# Patient Record
Sex: MALE | Race: WHITE | NOT HISPANIC OR LATINO | Employment: FULL TIME | URBAN - METROPOLITAN AREA
[De-identification: names, ages, dates, MRNs, and addresses within clinical notes are randomized per-mention and may not be internally consistent; named-entity substitution may affect disease eponyms.]

---

## 2017-02-01 ENCOUNTER — OFFICE VISIT (OUTPATIENT)
Dept: ORTHOPEDICS | Facility: CLINIC | Age: 27
End: 2017-02-01
Payer: COMMERCIAL

## 2017-02-01 ENCOUNTER — RADIANT APPOINTMENT (OUTPATIENT)
Dept: GENERAL RADIOLOGY | Facility: CLINIC | Age: 27
End: 2017-02-01
Attending: FAMILY MEDICINE
Payer: COMMERCIAL

## 2017-02-01 VITALS
BODY MASS INDEX: 24.12 KG/M2 | DIASTOLIC BLOOD PRESSURE: 86 MMHG | HEIGHT: 73 IN | WEIGHT: 182 LBS | SYSTOLIC BLOOD PRESSURE: 128 MMHG

## 2017-02-01 DIAGNOSIS — M79.645 FINGER PAIN, LEFT: ICD-10-CM

## 2017-02-01 DIAGNOSIS — M79.645 FINGER PAIN, LEFT: Primary | ICD-10-CM

## 2017-02-01 PROCEDURE — 99203 OFFICE O/P NEW LOW 30 MIN: CPT | Performed by: FAMILY MEDICINE

## 2017-02-01 PROCEDURE — 73140 X-RAY EXAM OF FINGER(S): CPT | Mod: LT

## 2017-02-01 NOTE — PATIENT INSTRUCTIONS
Thank you for allowing us to participate in your care today.  Please find below your visit diagnosis and the plan going forward.    1. Finger pain, left      Discussed your exam which is suggestive of a collateral injury  Activity modification as discussed - would mohan tape with frisbee and climbing (if you choose to do prior to hand therapy)  Hand therapy: [facility] - [phone]    Follow up after 4-6 visits of therapy if still having pain / not able to return to activty or sooner if needed. Call direct clinic number [[phone]] at any time with questions or concerns.    Prosper Rincon DO CAQSM  [facility]  Website: [email or web address]  Twitter: [email or web address]

## 2017-02-01 NOTE — MR AVS SNAPSHOT
After Visit Summary   2/1/2017    Wally Jalloh    MRN: [id number]           Patient Information     Date Of Birth          1990        Visit Information        Provider Department      2/1/2017 9:40 AM Prosper Rincon DO [facility] SP[facility]TS MEDICINE        Today's Diagnoses     Finger pain, left    -  1       Care Instructions    Thank you for allowing us to participate in your care today.  Please find below your visit diagnosis and the plan going forward.    1. Finger pain, left      Discussed your exam which is suggestive of a collateral injury  Activity modification as discussed - would mohan tape with frisbee and climbing (if you choose to do prior to hand therapy)  Hand therapy: [facility] - [phone]    Follow up after 4-6 visits of therapy if still having pain / not able to return to activty or sooner if needed. Call direct clinic number [[phone]] at any time with questions or concerns.    Prosper Rincon DO [facility]  Website: [email or web address]  Twitter: [email or web address]          Follow-ups after your visit        Additional Services     [facility] PT, HAND, AND CHIROPRACTIC REFERRAL       **This order will print in the [facility] Scheduling Office**    Physical Therapy, Hand Therapy and Chiropractic Care are available through:    *[facility]  *[facility]  *[facility]    Call one number to schedule at any of the above locations: [phone].    Your provider has referred you to: Hand Therapy    Indication/Reason for Referral: Left ring finger pain (PIP joint, collateral ligament)  Onset of Illness: 3 months  Therapy Orders: Evaluate and Treat  Special Programs: None  Special Request: [facility] [place]      Additional Comments for the Therapist or Chiropractor:     Please be aware that coverage of these services is subject to the terms and limitations of your  "health insurance plan.  Call member services at your health plan with any benefit or coverage questions.      Please bring the following to your appointment:    *Your personal calendar for scheduling future appointments  *Comfortable clothing                  Who to contact     If you have questions or need follow up information about today's clinic visit or your schedule please contact [facility] directly at [phone].  Normal or non-critical lab and imaging results will be communicated to you by MyChart, letter or phone within 4 business days after the clinic has received the results. If you do not hear from us within 7 days, please contact the clinic through [employer] or phone. If you have a critical or abnormal lab result, we will notify you by phone as soon as possible.  Submit refill requests through [employer] or call your pharmacy and they will forward the refill request to us. Please allow 3 business days for your refill to be completed.          Additional Information About Your Visit        [employer] Information     [employer] lets you send messages to your doctor, view your test results, renew your prescriptions, schedule appointments and more. To sign up, go to [email or web address] . Click on \"Log in\" on the left side of the screen, which will take you to the Welcome page. Then click on \"Sign up Now\" on the right side of the page.     You will be asked to enter the access code listed below, as well as some personal information. Please follow the directions to create your username and password.     Your access code is: [email or web address]  Expires: 2017 10:21 AM     Your access code will  in 90 days. If you need help or a new code, please call your [place] clinic or [phone].        Care EveryWhere ID     This is your Care EveryWhere ID. This could be used by other organizations to access your [place] medical records  [id number]        Your Vitals Were     Height BMI (Body " "Mass Index)                6' 1\" (1.854 m) 24.02 kg/m2           Blood Pressure from Last 3 Encounters:   02/01/17 128/86   10/14/15 138/72    Weight from Last 3 Encounters:   02/01/17 182 lb (82.555 kg)   10/14/15 183 lb (83.008 kg)              We Performed the Following     [facility] PT, HAND, AND CHIROPRACTIC REFERRAL        Primary Care Provider    Physician No Ref-Primary       No address on file        Thank you!     Thank you for choosing [facility]  for your care. Our goal is always to provide you with excellent care. Hearing back from our patients is one way we can continue to improve our services. Please take a few minutes to complete the written survey that you may receive in the mail after your visit with us. Thank you!             Your Updated Medication List - Protect others around you: Learn how to safely use, store and throw away your medicines at www.disposemymeds.org.      Notice  As of 2/1/2017 10:21 AM    You have not been prescribed any medications.      "

## 2017-02-01 NOTE — NURSING NOTE
"Chief Complaint   Patient presents with     Musculoskeletal Problem       Initial /86 mmHg  Ht 6' 1\" (1.854 m)  Wt 182 lb (82.555 kg)  BMI 24.02 kg/m2 Estimated body mass index is 24.02 kg/(m^2) as calculated from the following:    Height as of this encounter: 6' 1\" (1.854 m).    Weight as of this encounter: 182 lb (82.555 kg).  BP completed using cuff size: zaira Huerta ATC    "

## 2017-02-01 NOTE — PROGRESS NOTES
"ASSESSMENT & PLAN    ICD-10-CM    1. Finger pain, left M79.645 XR Finger Left G/E 2 Views     [facility], HAND, AND CHIR[facility]RACTIC REFERRAL   Reviewed xry - no fractures or acute osseous abnormalities  Exam appears consistent with a collateral sprain has a solid endpoint  Referral made to hand therapy for guidance on rehabilitation and progression back to normal activity.  Given the paper prescription in case he chooses to pursue outside of [place]  Discussed buddy taping with any activity in the interim and until he can progress forward with an therapy    Follow up as needed. Call direct clinic number [phone] at any time with questions or concerns. Instructed to call the office if the condition evolves or worsens.    -----    SUBJECTIVE  Wally Jalloh is a/an 26 year old right hand dominant male who is seen as self referral for evaluation of left ring finger (first joint) pain. The patient is seen by themselves.    Onset: 3 month(s) ago. Patient describes injury as he was running on a treadmill and jammed it on the handle of a treadmill.  Worsened by: unknown  Better with: stretching  Quality: stiff, lack of strength  Pain Scale (maximum/current)/10: 2/10 / 1/10  Treatments tried: rest/activity avoidance, ice and tape, theraputty  Orthopedic history: NO  Relevant surgical history: NO  Patient Social History: works as     Patient's past medical, surgical, social, and family histories were reviewed today and exceptions are as follows: see history.    REVIEW OF SYSTEMS:  10 point ROS is negative other than symptoms noted above in HPI, Past Medical History or as stated below  Constitutional: NEGATIVE for fever, chills, change in weight  Skin: NEGATIVE for worrisome rashes, moles or lesions  GI/: NEGATIVE for bowel or bladder changes  Neuro: NEGATIVE for weakness, dizziness or paresthesias    OBJECTIVE:  /86 mmHg  Ht 6' 1\" (1.854 m)  Wt 182 lb (82.555 kg)  BMI 24.02 kg/m2   General: " healthy, alert and in no distress  HEENT: no scleral icterus or conjunctival erythema  Skin: no suspicious lesions or rash. No jaundice.  CV: regular rhythm by palpation  Resp: normal respiratory effort without conversational dyspnea   Psych: normal mood and affect  Gait: normal steady gait with appropriate coordination and balance  Neuro: normal light touch sensory exam of the bilateral hands.  Motor strength as noted below  MSK:  LEFT HAND  Inspection:    Trace swelling over PIP of the left ring finger   Palpation:   Fingers: Nontender over the volar plate collateral's and central slip.  Range of Motion:    Full range of motion in flexion and extension  Strength:     full  Special Tests:    Positive: Has slight pain with stressing of radial collateral at the PIP joint with a solid endpoint.     Negative: extensor lag at DIP, flexor digitorum superficialis testing, flexor digitorum profundus testing    Independent visualization of the below image:  Recent Results (from the past 24 hour(s))   XR Finger Left G/E 2 Views    Narrative    LEFT FINGER TWO OR MORE VIEWS  2/1/2017 10:09 AM     COMPARISON: None.    HISTORY: Pain in left finger(s).    FINDINGS: The visualized bones and joint spaces are within normal  limits.      Impression    IMPRESSION: No evidence for fracture, dislocation or significant  degenerative change of the left fourth finger.    ELAINA ROSARIO MD     Patient's conditions were thoroughly discussed during today's visit with greater than 50% of the visit spent counseling the patient with total time spent face-to-face with the patient being 15 minutes.    Prosper Rincon, DO [facility]

## 2017-02-24 ENCOUNTER — TELEPHONE (OUTPATIENT)
Dept: FAMILY MEDICINE | Facility: CLINIC | Age: 27
End: 2017-02-24

## 2017-02-24 NOTE — TELEPHONE ENCOUNTER
Patient c/o upper abdominal/chest pain intermittent x 2 days. States pain is not one side or another, but spreads across chest and abdomen. Worse with exercise and after eating. Denies nausea. Describes pain as sharp when having episode. States some pain with deep inspirations and slight cough.     Advised patient to be seen in clinic or [facility] today.    Informed CORDELL of patient condition.    Concepcion Gonzalez RN

## 2018-07-14 ENCOUNTER — TRANSFERRED RECORDS (OUTPATIENT)
Dept: HEALTH INFORMATION MANAGEMENT | Facility: CLINIC | Age: 28
End: 2018-07-14

## 2018-07-18 ENCOUNTER — TELEPHONE (OUTPATIENT)
Dept: SURGERY | Facility: CLINIC | Age: 28
End: 2018-07-18

## 2018-07-18 ENCOUNTER — OFFICE VISIT (OUTPATIENT)
Dept: SURGERY | Facility: CLINIC | Age: 28
End: 2018-07-18
Payer: COMMERCIAL

## 2018-07-18 VITALS
DIASTOLIC BLOOD PRESSURE: 86 MMHG | SYSTOLIC BLOOD PRESSURE: 133 MMHG | HEIGHT: 73 IN | WEIGHT: 182 LBS | BODY MASS INDEX: 24.12 KG/M2 | HEART RATE: 54 BPM

## 2018-07-18 DIAGNOSIS — K40.90 LEFT INGUINAL HERNIA: Primary | ICD-10-CM

## 2018-07-18 PROCEDURE — 99244 OFF/OP CNSLTJ NEW/EST MOD 40: CPT | Performed by: SURGERY

## 2018-07-18 NOTE — LETTER
"2018    Re: Wally CAMARILLO Shubham - 1990    HPI: Patient is a 27-year-old gentleman referred by the above-mentioned provider for consultation regarding left-sided groin pain.  He participates in ultimate Frisbee states that he was injured approximately 2 months ago.  During a leg sweep maneuver he felt some degree of discomfort in his left upper thigh and groin.  This got worse during his game.  He continued to play through this and treated it primarily with stretches and hope that it would get outer.  This has however gotten progressively worse.  He took a couple of weeks off and felt some improvement in symptoms then.  He however has been unable to resume normal activity.  He has tried physical therapy without significant improvement.  Feels discomfort getting out of a car or rolling over in bed.  He has discomfort with core exercises as well as with sit ups and crunches.  He has pain with sneezing.  He has no radiating pain into his testicle.  No signs or symptoms to suggest incarceration or strangulation.     PMH:   has a past medical history of NO ACTIVE PROBLEMS.  PSH:    has a past surgical history that includes no history of surgery.  Social History:   reports that he has been smoking.  He has never used smokeless tobacco. He reports that he drinks alcohol. He reports that he does not use illicit drugs.  Family History:  family history includes Breast Cancer in his maternal grandmother; Family History Negative in his father and mother.  Medications/Allergies: Home medications and allergies reviewed.     ROS:  The 10 point Review of Systems is negative other than noted in the HPI.     Physical Exam:  /86  Pulse 54  Ht 6' 1\" (1.854 m)  Wt 182 lb (82.6 kg)  BMI 24.01 kg/m2  GENERAL: Generally appears well.  Psych: Alert and Oriented.  Normal affect  Eyes: Sclera clear  Respiratory:  Lungs clear to ausculation bilaterally with good air excursion  Cardiovascular:  Regular Rate and Rhythm with " no murmurs gallops or rubs, normal peripheral pulses  GI: Abdomen Non Distended Non-Tender  No hernias palpated..  Groin- I examined the patient in both the standing and supine positions. Right Groin- No hernia Palpated. Left Groin- Small inguinal hernia.  Tenderness palpated in groin. No scrotal or testicle abnormalities.  He also has isolated discomfort within the area around the insertion of the left adductor tendon.  Lymphatic/Hematologic/Immune:  No femoral or cervical lymphadenopathy.  Integumentary:  No rashes  Neurological: grossly intact      All new lab and imaging data was reviewed.      Impression and Plan:  Patient is a 27 year old male with left inguinal hernia and left adductor tendinopathy     PLAN: Recommend outpatient open repair of left inguinal hernia and would suggest coordinating with tendon lengthening surgery.  Referral will be made for orthopedic evaluation to evaluate and possibly proceed in this manner.  I discussed the pathophysiology of hernias and options for repair including laparoscopic VS open.  The risks associated with the procedure including, but not limited to, recurrence, nerve entrapment or injury, persistence of pain, injury to the bowel/bladder, infertility, hematoma, mesh migration, mesh infection, MI, and PE were discussed with the patient. He indicated understanding of the discussion, asked appropriate questions, and provided consent. Signs and symptoms of incarceration were discussed. If these develop in the interim, he promises to call or go straight to the ER. I have provided the patient with an information pamphlet.        Thank you very much for this consult.     Andrew Rush M.D.  [facility]  [phone]

## 2018-07-18 NOTE — TELEPHONE ENCOUNTER
Patient was in today and says he was referred to a doctor  By [facility] but doesn't remember the doctors  Name  Would like a call back with this information.      Phone:[phone]    Message ok

## 2018-07-18 NOTE — MR AVS SNAPSHOT
"              After Visit Summary   2018    Wally Jalloh    MRN: [id number]           Patient Information     Date Of Birth          1990        Visit Information        Provider Department      2018 2:15 PM Andrew Rush MD [facility]       Follow-ups after your visit        Who to contact     If you have questions or need follow up information about today's clinic visit or your schedule please contact [facility] directly at [phone].  Normal or non-critical lab and imaging results will be communicated to you by [employer], letter or phone within 4 business days after the clinic has received the results. If you do not hear from us within 7 days, please contact the clinic through [employer] or phone. If you have a critical or abnormal lab result, we will notify you by phone as soon as possible.  Submit refill requests through [employer] or call your pharmacy and they will forward the refill request to us. Please allow 3 business days for your refill to be completed.          Additional Information About Your Visit        MyChart Information     [employer] lets you send messages to your doctor, view your test results, renew your prescriptions, schedule appointments and more. To sign up, go to [email or web address] . Click on \"Log in\" on the left side of the screen, which will take you to the Welcome page. Then click on \"Sign up Now\" on the right side of the page.     You will be asked to enter the access code listed below, as well as some personal information. Please follow the directions to create your username and password.     Your access code is: [email or web address]  Expires: 10/16/2018  2:42 PM     Your access code will  in 90 days. If you need help or a new code, please call your [place] clinic or [phone].        Care EveryWhere ID     This is your Care EveryWhere ID. This could be used by other " "organizations to access your [place] medical records  [id number]        Your Vitals Were     Pulse Height BMI (Body Mass Index)             54 6' 1\" (1.854 m) 24.01 kg/m2          Blood Pressure from Last 3 Encounters:   07/18/18 133/86   02/01/17 128/86   10/14/15 138/72    Weight from Last 3 Encounters:   07/18/18 182 lb (82.6 kg)   02/01/17 182 lb (82.6 kg)   10/14/15 183 lb (83 kg)              Today, you had the following     No orders found for display       Primary Care Provider Fax #    Physician No Ref-Primary [phone]       No address on file        Equal Access to Services     GRACIE SALAMANCA : Diane Moody, yanely altamirano, papo travis, mary angeles . So [facility] [phone].    ATENCIÓN: Si habla español, tiene a reich disposición servicios gratuitos de asistencia lingüística. Llame al [phone].    We comply with applicable federal civil rights laws and [place] laws. We do not discriminate on the basis of race, color, national origin, age, disability, sex, sexual orientation, or gender identity.            Thank you!     Thank you for choosing [facility]  for your care. Our goal is always to provide you with excellent care. Hearing back from our patients is one way we can continue to improve our services. Please take a few minutes to complete the written survey that you may receive in the mail after your visit with us. Thank you!             Your Updated Medication List - Protect others around you: Learn how to safely use, store and throw away your medicines at www.disposemymeds.org.      Notice  As of 7/18/2018  2:42 PM    You have not been prescribed any medications.      "

## 2018-07-19 NOTE — TELEPHONE ENCOUNTER
Called patient back to provide name of Dr. Reji Chamorro.  Informed him that the schedulers at [facility] should be calling him to set up a consultation.    Ruthy Russo RN

## 2018-07-19 NOTE — PROGRESS NOTES
"[facility]  Surgery Consultation    CONSULTATION REQUESTED BY:  Garcia Tran MD    HPI: Patient is a 27-year-old gentleman referred by the above-mentioned provider for consultation regarding left-sided groin pain.  He participates in ultimate Frisbee states that he was injured approximately 2 months ago.  During a leg sweep maneuver he felt some degree of discomfort in his left upper thigh and groin.  This got worse during his game.  He continued to play through this and treated it primarily with stretches and hope that it would get outer.  This has however gotten progressively worse.  He took a couple of weeks off and felt some improvement in symptoms then.  He however has been unable to resume normal activity.  He has tried physical therapy without significant improvement.  Feels discomfort getting out of a car or rolling over in bed.  He has discomfort with core exercises as well as with sit ups and crunches.  He has pain with sneezing.  He has no radiating pain into his testicle.  No signs or symptoms to suggest incarceration or strangulation.    PMH:   has a past medical history of NO ACTIVE PROBLEMS.  PSH:    has a past surgical history that includes no history of surgery.  Social History:   reports that he has been smoking.  He has never used smokeless tobacco. He reports that he drinks alcohol. He reports that he does not use illicit drugs.  Family History:  family history includes Breast Cancer in his maternal grandmother; Family History Negative in his father and mother.  Medications/Allergies: Home medications and allergies reviewed.    ROS:  The 10 point Review of Systems is negative other than noted in the HPI.    Physical Exam:  /86  Pulse 54  Ht 6' 1\" (1.854 m)  Wt 182 lb (82.6 kg)  BMI 24.01 kg/m2  GENERAL: Generally appears well.  Psych: Alert and Oriented.  Normal affect  Eyes: Sclera clear  Respiratory:  Lungs clear to ausculation bilaterally with good air " excursion  Cardiovascular:  Regular Rate and Rhythm with no murmurs gallops or rubs, normal peripheral pulses  GI: Abdomen Non Distended Non-Tender  No hernias palpated..  Groin- I examined the patient in both the standing and supine positions. Right Groin- No hernia Palpated. Left Groin- Small inguinal hernia.  Tenderness palpated in groin. No scrotal or testicle abnormalities.  He also has isolated discomfort within the area around the insertion of the left adductor tendon.  Lymphatic/Hematologic/Immune:  No femoral or cervical lymphadenopathy.  Integumentary:  No rashes  Neurological: grossly intact     All new lab and imaging data was reviewed.     Impression and Plan:  Patient is a 27 year old male with left inguinal hernia and left adductor tendinopathy    PLAN: Recommend outpatient open repair of left inguinal hernia and would suggest coordinating with tendon lengthening surgery.  Referral will be made for orthopedic evaluation to evaluate and possibly proceed in this manner.  I discussed the pathophysiology of hernias and options for repair including laparoscopic VS open.  The risks associated with the procedure including, but not limited to, recurrence, nerve entrapment or injury, persistence of pain, injury to the bowel/bladder, infertility, hematoma, mesh migration, mesh infection, MI, and PE were discussed with the patient. He indicated understanding of the discussion, asked appropriate questions, and provided consent. Signs and symptoms of incarceration were discussed. If these develop in the interim, he promises to call or go straight to the ER. I have provided the patient with an information pamphlet.      Thank you very much for this consult.    Andrew Rush M.D.  [facility]  [phone]    Please route or send letter to:  Primary Care Provider (PCP) and Referring Provider

## 2018-07-24 ENCOUNTER — TELEPHONE (OUTPATIENT)
Dept: SURGERY | Facility: CLINIC | Age: 28
End: 2018-07-24

## 2018-07-24 NOTE — TELEPHONE ENCOUNTER
Type of surgery: Open left inguinal hernia repair  Location of surgery: [facility]  Date and time of surgery: 8/21/18 at 1pm  Surgeon: Dr. Andrew Rush  Pre-Op Appt Date: Patient to schedule  Post-Op Appt Date: Patient to schedule   Packet sent out: Yes  Pre-cert/Authorization completed:  Not Applicable  Date: 7/24/18    Coordinated with Dr. Chamorro adductor lengthening ([facility]ehra #[phone])

## 2018-07-24 NOTE — TELEPHONE ENCOUNTER
Name of caller: Patient    Reason for Call:  Scheduled for LIH repair--coordinated with Dr. Chamorro adductor lengthening on 8/21/18.  Since the 21st is the first available date for both surgeons patient is wondering what he can do for pain control in the meantime and would also like to discuss recovery expectations.    Surgeon:  Dr. Rush     Recent Surgery:  No    If yes, when & what type:  N/A      Best phone number to reach pt at is: [phone]  Ok to leave a message with medical info? Yes.    Pharmacy preferred (if calling for a refill): N/A

## 2018-07-24 NOTE — TELEPHONE ENCOUNTER
Patient will come in tomorrow to discuss questions regarding surgery in person with Dr. Rush.    Ruthy Russo RN

## 2018-07-25 ENCOUNTER — OFFICE VISIT (OUTPATIENT)
Dept: SURGERY | Facility: CLINIC | Age: 28
End: 2018-07-25
Payer: COMMERCIAL

## 2018-07-25 DIAGNOSIS — K40.90 LEFT INGUINAL HERNIA: Primary | ICD-10-CM

## 2018-07-25 PROCEDURE — 99213 OFFICE O/P EST LOW 20 MIN: CPT | Performed by: SURGERY

## 2018-07-25 NOTE — MR AVS SNAPSHOT
After Visit Summary   7/25/2018    Wally Jalloh    MRN: [id number]           Patient Information     Date Of Birth          1990        Visit Information        Provider Department      7/25/2018 1:15 PM Andrew Rush MD [facility] General Surgery      Today's Diagnoses     Left inguinal hernia    -  1      Care Instructions    Your surgery has been changed to 8/28/18 1:00 pm at [facility]          Follow-ups after your visit        Your next 10 appointments already scheduled     Aug 02, 2018  8:45 AM CDT   CONSULT with Tk Rodriguez MD   [facility] ([facility])    [address]  [facility] [id number] [phone]            Aug 28, 2018   Procedure with Andrew Rush MD   [facility] (--)    [address]  [facility] [address] [phone]            Aug 28, 2018  1:00 PM CDT   [facility] Same Day Surgery with Andrwe Rush MD   [facility])    [facility])   [phone]              Who to contact     If you have questions or need follow up information about today's clinic visit or your schedule please contact [facility] directly at [phone].  Normal or non-critical lab and imaging results will be communicated to you by MyChart, letter or phone within 4 business days after the clinic has received the results. If you do not hear from us within 7 days, please contact the clinic through MyChart or phone. If you have a critical or abnormal lab result, we will notify you by phone as soon as possible.  Submit refill requests through [employer] or call your pharmacy and they will forward the refill request to us. Please allow 3 business days for your refill to be completed.           "Additional Information About Your Visit        MyChart Information     [employer] lets you send messages to your doctor, view your test results, renew your prescriptions, schedule appointments and more. To sign up, go to [email or web address] . Click on \"Log in\" on the left side of the screen, which will take you to the Welcome page. Then click on \"Sign up Now\" on the right side of the page.     You will be asked to enter the access code listed below, as well as some personal information. Please follow the directions to create your username and password.     Your access code is: [email or web address]  Expires: 10/16/2018  2:42 PM     Your access code will  in 90 days. If you need help or a new code, please call your [place] clinic or [phone].        Care EveryWhere ID     This is your Care EveryWhere ID. This could be used by other organizations to access your [place] medical records  [id number]         Blood Pressure from Last 3 Encounters:   18 133/86   17 128/86   10/14/15 138/72    Weight from Last 3 Encounters:   18 182 lb (82.6 kg)   17 182 lb (82.6 kg)   10/14/15 183 lb (83 kg)              Today, you had the following     No orders found for display       Primary Care Provider Fax #    Physician No Ref-Primary [phone]       No address on file        Equal Access to Services     [facility] : Hadii zaheer ku hadasho Sodavidali, waaxda luqadaha, qaybta kaalmada adeegyada, mary angeles . So [facility] [phone].    ATENCIÓN: Si habla español, tiene a reich disposición servicios gratuitos de asistencia lingüística. John al [phone].    We comply with applicable federal civil rights laws and [place] laws. We do not discriminate on the basis of race, color, national origin, age, disability, sex, sexual orientation, or gender identity.            Thank you!     Thank you for choosing [facility]  for your care. Our goal is always to provide " you with excellent care. Hearing back from our patients is one way we can continue to improve our services. Please take a few minutes to complete the written survey that you may receive in the mail after your visit with us. Thank you!             Your Updated Medication List - Protect others around you: Learn how to safely use, store and throw away your medicines at www.disposemymeds.org.      Notice  As of 7/25/2018 11:59 PM    You have not been prescribed any medications.

## 2018-07-25 NOTE — LETTER
2018    RE: Wally Jalloh, : 1990        Patient presents to discuss upcoming surgery.  Scheduled for left inguinal hernia repair and tendon lengthening surgery.  He had several questions.  Been in the office and discussed his procedure.  We also discussed the outcomes.  His questions were all answered to his satisfaction.  We will proceed with surgery as scheduled.    Andrew Rush MD

## 2018-07-28 NOTE — PROGRESS NOTES
Patient presents to discuss upcoming surgery.  Scheduled for left inguinal hernia repair and tendon lengthening surgery.  He had several questions.  Been in the office and discussed his procedure.  We also discussed the outcomes.  His questions were all answered to his satisfaction.  We will proceed with surgery as scheduled.  Total time spent was 20 minutes with greater than 50% in face-to-face consultation.

## 2018-08-23 ENCOUNTER — TELEPHONE (OUTPATIENT)
Dept: SURGERY | Facility: CLINIC | Age: 28
End: 2018-08-23

## 2018-08-23 ENCOUNTER — OFFICE VISIT (OUTPATIENT)
Dept: FAMILY MEDICINE | Facility: CLINIC | Age: 28
End: 2018-08-23
Payer: COMMERCIAL

## 2018-08-23 VITALS
DIASTOLIC BLOOD PRESSURE: 72 MMHG | OXYGEN SATURATION: 95 % | SYSTOLIC BLOOD PRESSURE: 122 MMHG | BODY MASS INDEX: 24.52 KG/M2 | WEIGHT: 185 LBS | HEART RATE: 69 BPM | HEIGHT: 73 IN | TEMPERATURE: 97.3 F

## 2018-08-23 DIAGNOSIS — Z01.818 PREOP GENERAL PHYSICAL EXAM: Primary | ICD-10-CM

## 2018-08-23 DIAGNOSIS — K40.90 LEFT INGUINAL HERNIA: ICD-10-CM

## 2018-08-23 DIAGNOSIS — M76.899 ADDUCTOR TENDONITIS: ICD-10-CM

## 2018-08-23 LAB
HGB BLD-MCNC: 14.8 G/DL (ref 13.3–17.7)
PLATELET # BLD AUTO: 198 10E9/L (ref 150–450)

## 2018-08-23 PROCEDURE — 36415 COLL VENOUS BLD VENIPUNCTURE: CPT | Performed by: INTERNAL MEDICINE

## 2018-08-23 PROCEDURE — 85018 HEMOGLOBIN: CPT | Performed by: INTERNAL MEDICINE

## 2018-08-23 PROCEDURE — 99214 OFFICE O/P EST MOD 30 MIN: CPT | Performed by: INTERNAL MEDICINE

## 2018-08-23 PROCEDURE — 85049 AUTOMATED PLATELET COUNT: CPT | Performed by: INTERNAL MEDICINE

## 2018-08-23 RX ORDER — DIPHENOXYLATE HYDROCHLORIDE AND ATROPINE SULFATE 2.5; .025 MG/1; MG/1
1 TABLET ORAL DAILY
COMMUNITY

## 2018-08-23 NOTE — TELEPHONE ENCOUNTER
Patient is scheduled for left inguinal hernia repair with mesh with Dr. Rush as well as left adductor lengthening with Dr. Chamorro on 8/28/18.    He is calling wondering about the mesh being used.  He has been doing some research and is wondering if it is the permanent kind or the absorbable kind that will be used.    Informed him that the mesh used by Dr. Rush is permanent.  Also informed him that performing the procedure without mesh or mesh that is not permanent, greatly increases his chance for hernia recurrence in the future.    Patient was under the impression that it would not be in his body forever.  He seems concerned about this.  Informed him that he can discuss with Dr. Rush prior to the surgery if he chooses.  He would like to set up an office visit to discuss.    Appointment made for Monday the 26th to discuss his concerns with Dr. Rush.    Ruthy Russo RN

## 2018-08-23 NOTE — MR AVS SNAPSHOT
After Visit Summary   8/23/2018    Wally Jalloh    MRN: [id number]           Patient Information     Date Of Birth          1990        Visit Information        Provider Department      8/23/2018 8:20 AM Angy Hua MD [place] [facility]        Today's Diagnoses     Preop general physical exam    -  1    Adductor tendonitis        Left inguinal hernia          Care Instructions      Before Your Surgery      Call your surgeon if there is any change in your health. This includes signs of a cold or flu (such as a sore throat, runny nose, cough, rash or fever).    Do not smoke, drink alcohol or take over the counter medicine (unless your surgeon or primary care doctor tells you to) for the 24 hours before and after surgery.    If you take prescribed drugs: Follow your doctor s orders about which medicines to take and which to stop until after surgery.    Eating and drinking prior to surgery: follow the instructions from your surgeon    Take a shower or bath the night before surgery. Use the soap your surgeon gave you to gently clean your skin. If you do not have soap from your surgeon, use your regular soap. Do not shave or scrub the surgery site.  Wear clean pajamas and have clean sheets on your bed.           Follow-ups after your visit        Your next 10 appointments already scheduled     Aug 28, 2018   Procedure with Andrew Rush MD   [facility] (--)    [address]  [facility] [address]   [phone]            Aug 28, 2018  1:00 PM CDT   [facility] Same Day Surgery with Andrew Rush MD   [facility] ([facility])    [facility])   [phone]              Who to contact     If you have questions or need follow up information about today's clinic visit or your schedule please contact [facility] directly at  "[phone].  Normal or non-critical lab and imaging results will be communicated to you by MyChart, letter or phone within 4 business days after the clinic has received the results. If you do not hear from us within 7 days, please contact the clinic through MyChart or phone. If you have a critical or abnormal lab result, we will notify you by phone as soon as possible.  Submit refill requests through [employer] or call your pharmacy and they will forward the refill request to us. Please allow 3 business days for your refill to be completed.          Additional Information About Your Visit        Care EveryWhere ID     This is your Care EveryWhere ID. This could be used by other organizations to access your [place] medical records  [id number]        Your Vitals Were     Pulse Temperature Height Pulse Oximetry BMI (Body Mass Index)       69 97.3  F (36.3  C) (Oral) 6' 1\" (1.854 m) 95% 24.41 kg/m2        Blood Pressure from Last 3 Encounters:   08/23/18 122/72   07/18/18 133/86   02/01/17 128/86    Weight from Last 3 Encounters:   08/23/18 185 lb (83.9 kg)   07/18/18 182 lb (82.6 kg)   02/01/17 182 lb (82.6 kg)              We Performed the Following     Hemoglobin     Platelet count        Primary Care Provider Office Phone # Fax #    Angy Tk Hua [phone] [address] [facility] [address]  [facility] [address]        Equal Access to Services     [facility]: Hadii aad ku hadasho Sodavidali, waaxda luqadaha, qaybta kaalmada adeegyacamilla, mary angeles . So [facility] [phone].    ATENCIÓN: Si habla roberta, tiene a reich disposición servicios gratuitos de asistencia lingüística. Llame al [phone].    We comply with applicable federal civil rights laws and [place] laws. We do not discriminate on the basis of race, color, national origin, age, disability, sex, sexual orientation, or gender identity.            Thank you!     Thank you for choosing [facility]  for " your care. Our goal is always to provide you with excellent care. Hearing back from our patients is one way we can continue to improve our services. Please take a few minutes to complete the written survey that you may receive in the mail after your visit with us. Thank you!             Your Updated Medication List - Protect others around you: Learn how to safely use, store and throw away your medicines at www.disposemymeds.org.          This list is accurate as of 8/23/18 12:46 PM.  Always use your most recent med list.                   Brand Name Dispense Instructions for use Diagnosis    MULTI-VITAMINS Tabs      Take 1 tablet by mouth daily

## 2018-08-23 NOTE — LETTER
[address] [facility]  [address]  [place]  [address]  Tel: [phone]    August 23, 2018    Wally Jalloh  [address] [place] [id number]        Dear Mr. Jalloh,    The results of your recent labs were within normal limits.      Resulted Orders   Hemoglobin   Result Value Ref Range    Hemoglobin 14.8 13.3 - 17.7 g/dL   Platelet count   Result Value Ref Range    Platelet Count 198 150 - 450 10e9/L          If you have any further questions or problems, please contact our office.      Sincerely,    Tk Hua MD / shanda

## 2018-08-23 NOTE — PROGRESS NOTES
[address] [address]  [phone]  Dept: [phone]    PRE-OP EVALUATION:  Today's date: 2018    Wally Jalloh (: 1990) presents for pre-operative evaluation assessment as requested by Andrew Clinton and Reji Chamorro.  He requires evaluation and anesthesia risk assessment prior to undergoing surgery/procedure for treatment of left inguinal hernia and left adductor tendonitis.    Proposed Surgery/ Procedure: HERNIORRHAPHY INGUINAL, LEFT. TENOTOMY HIP ADDUCTOR, LEFT.  Date of Surgery/ Procedure: 2018  Time of Surgery/ Procedure: 1:00 PM  Hospital/Surgical Facility:  OR  Fax number for surgical facility: [phone]  Primary Physician: No Ref-Primary, Physician  Type of Anesthesia Anticipated: General    Patient has a Health Care Directive or Living Will:  NO    1. NO - Do you have a history of heart attack, stroke, stent, bypass or surgery on an artery in the head, neck, heart or legs?  2. NO - Do you ever have any pain or discomfort in your chest?  3. NO - Do you have a history of  Heart Failure?  4. NO - Are you troubled by shortness of breath when: walking on the level, up a slight hill or at night?  5. NO - Do you currently have a cold, bronchitis or other respiratory infection?  6. NO - Do you have a cough, shortness of breath or wheezing?  7. NO - Do you sometimes get pains in the calves of your legs when you walk?  8. NO - Do you or anyone in your family have previous history of blood clots?  9. NO - Do you or does anyone in your family have a serious bleeding problem such as prolonged bleeding following surgeries or cuts?  10. NO - Have you ever had problems with anemia or been told to take iron pills?  11. NO - Have you had any abnormal blood loss such as black, tarry or bloody stools, or abnormal vaginal bleeding?  12. NO - Have you ever had a blood transfusion?  13. NO - Have you or any of your relatives ever had  "problems with anesthesia?  14. NO - Do you have sleep apnea, excessive snoring or daytime drowsiness?  15. NO - Do you have any prosthetic heart valves?  16. NO - Do you have prosthetic joints?  17. NO - Is there any chance that you may be pregnant?      HPI:     HPI related to upcoming procedure: patient Wally Jalloh is a very pleasant 27 year old male with who presents to internal medicine clinic today for a pre op cardiac evaluation for upcoming surgeries for : HERNIORRHAPHY INGUINAL, LEFT and TENOTOMY HIP ADDUCTOR, LEFT for treatment of left inguinal hernia and left adductor tendonitis. Patient denies any allergies to anesthesia agents. Patient denies any chest pain, headaches, fever or chills. Patient denies any CAD, CVA, bleeding disorder or clotting disorder or Type 2 Diabetes. Patient does not take any daily aspirin and any other blood thinner medications.       MEDICAL HISTORY:     Patient Active Problem List    Diagnosis Date Noted     Genital warts 10/16/2015     Priority: Medium      Past Medical History:   Diagnosis Date     NO ACTIVE PROBLEMS      Past Surgical History:   Procedure Laterality Date     NO HISTORY OF SURGERY       No current outpatient prescriptions on file.     OTC products: None, except as noted above    No Known Allergies   Latex Allergy: NO    Social History   Substance Use Topics     Smoking status: Light Tobacco Smoker     Smokeless tobacco: Never Used      Comment: Occ Cigar      Alcohol use 0.0 oz/week     0 Standard drinks or equivalent per week     History   Drug Use No       REVIEW OF SYSTEMS:   Constitutional, neuro, ENT, endocrine, pulmonary, cardiac, gastrointestinal, genitourinary, musculoskeletal, integument and psychiatric systems are negative, except as otherwise noted.    EXAM:   /72 (BP Location: Left arm, Cuff Size: Adult Regular)  Pulse 69  Temp 97.3  F (36.3  C) (Oral)  Ht 6' 1\" (1.854 m)  Wt 185 lb (83.9 kg)  SpO2 95%  BMI 24.41 kg/m2    GENERAL " APPEARANCE: alert and no distress     EYES: EOMI,  PERRL     HENT: ear canals and TM's normal and nose and mouth without ulcers or lesions     NECK: no adenopathy, no asymmetry, masses, or scars and thyroid normal to palpation     RESP: lungs clear to auscultation - no rales, rhonchi or wheezes     CV: regular rates and rhythm, normal S1 S2, no S3 or S4 and no murmur, click or rub     ABDOMEN:  soft, nontender, no HSM or masses and bowel sounds normal     MS: extremities normal- no gross deformities noted, no evidence of inflammation in joints, FROM in all extremities.     SKIN: no suspicious lesions or rashes     NEURO: Normal strength and tone, sensory exam grossly normal, mentation intact and speech normal     PSYCH: mentation appears normal. and affect normal/bright     LYMPHATICS: No cervical adenopathy    DIAGNOSTICS:     Results for orders placed or performed in visit on 08/23/18   Hemoglobin   Result Value Ref Range    Hemoglobin 14.8 13.3 - 17.7 g/dL   Platelet count   Result Value Ref Range    Platelet Count 198 150 - 450 10e9/L         IMPRESSION:   Reason for surgery/procedure: left inguinal hernia and left adductor tendonitis.  Diagnosis/reason for consult: pre op cardiac evaluation for upcoming surgeries for : HERNIORRHAPHY INGUINAL, LEFT and TENOTOMY HIP ADDUCTOR, LEFT for treatment of left inguinal hernia and left adductor tendonitis.    The proposed surgical procedure is considered INTERMEDIATE risk.    REVISED CARDIAC RISK INDEX  The patient has the following serious cardiovascular risks for perioperative complications such as (MI, PE, VFib and 3  AV Block):  No serious cardiac risks  INTERPRETATION: 0 risks: Class I (very low risk - 0.4% complication rate)    The patient has the following additional risks for perioperative complications:  No identified additional risks      ICD-10-CM    1. Preop general physical exam Z01.818    2. Adductor tendonitis M65.80    3. Left inguinal hernia K40.90         RECOMMENDATIONS:       Cardiovascular Risk    --Patient is to take all scheduled medications on the day of surgery.    APPROVAL GIVEN to proceed with proposed procedure, without further diagnostic evaluation       Signed Electronically by: Angy Hua MD    Copy of this evaluation report is provided to requesting physician.    [facility] Preop Guidelines    Revised Cardiac Risk Index

## 2018-08-24 NOTE — H&P (VIEW-ONLY)
[address] [address]  [phone]  Dept: [phone]    PRE-OP EVALUATION:  Today's date: 2018    Wally Jalloh (: 1990) presents for pre-operative evaluation assessment as requested by Andrew Clinton and Reji Chamorro.  He requires evaluation and anesthesia risk assessment prior to undergoing surgery/procedure for treatment of left inguinal hernia and left adductor tendonitis.    Proposed Surgery/ Procedure: HERNIORRHAPHY INGUINAL, LEFT. TENOTOMY HIP ADDUCTOR, LEFT.  Date of Surgery/ Procedure: 2018  Time of Surgery/ Procedure: 1:00 PM  Hospital/Surgical Facility:  OR  Fax number for surgical facility: [phone]  Primary Physician: No Ref-Primary, Physician  Type of Anesthesia Anticipated: General    Patient has a Health Care Directive or Living Will:  NO    1. NO - Do you have a history of heart attack, stroke, stent, bypass or surgery on an artery in the head, neck, heart or legs?  2. NO - Do you ever have any pain or discomfort in your chest?  3. NO - Do you have a history of  Heart Failure?  4. NO - Are you troubled by shortness of breath when: walking on the level, up a slight hill or at night?  5. NO - Do you currently have a cold, bronchitis or other respiratory infection?  6. NO - Do you have a cough, shortness of breath or wheezing?  7. NO - Do you sometimes get pains in the calves of your legs when you walk?  8. NO - Do you or anyone in your family have previous history of blood clots?  9. NO - Do you or does anyone in your family have a serious bleeding problem such as prolonged bleeding following surgeries or cuts?  10. NO - Have you ever had problems with anemia or been told to take iron pills?  11. NO - Have you had any abnormal blood loss such as black, tarry or bloody stools, or abnormal vaginal bleeding?  12. NO - Have you ever had a blood transfusion?  13. NO - Have you or any of your relatives ever had  "problems with anesthesia?  14. NO - Do you have sleep apnea, excessive snoring or daytime drowsiness?  15. NO - Do you have any prosthetic heart valves?  16. NO - Do you have prosthetic joints?  17. NO - Is there any chance that you may be pregnant?      HPI:     HPI related to upcoming procedure: patient Wally Jalloh is a very pleasant 27 year old male with who presents to internal medicine clinic today for a pre op cardiac evaluation for upcoming surgeries for : HERNIORRHAPHY INGUINAL, LEFT and TENOTOMY HIP ADDUCTOR, LEFT for treatment of left inguinal hernia and left adductor tendonitis. Patient denies any allergies to anesthesia agents. Patient denies any chest pain, headaches, fever or chills. Patient denies any CAD, CVA, bleeding disorder or clotting disorder or Type 2 Diabetes. Patient does not take any daily aspirin and any other blood thinner medications.       MEDICAL HISTORY:     Patient Active Problem List    Diagnosis Date Noted     Genital warts 10/16/2015     Priority: Medium      Past Medical History:   Diagnosis Date     NO ACTIVE PROBLEMS      Past Surgical History:   Procedure Laterality Date     NO HISTORY OF SURGERY       No current outpatient prescriptions on file.     OTC products: None, except as noted above    No Known Allergies   Latex Allergy: NO    Social History   Substance Use Topics     Smoking status: Light Tobacco Smoker     Smokeless tobacco: Never Used      Comment: Occ Cigar      Alcohol use 0.0 oz/week     0 Standard drinks or equivalent per week     History   Drug Use No       REVIEW OF SYSTEMS:   Constitutional, neuro, ENT, endocrine, pulmonary, cardiac, gastrointestinal, genitourinary, musculoskeletal, integument and psychiatric systems are negative, except as otherwise noted.    EXAM:   /72 (BP Location: Left arm, Cuff Size: Adult Regular)  Pulse 69  Temp 97.3  F (36.3  C) (Oral)  Ht 6' 1\" (1.854 m)  Wt 185 lb (83.9 kg)  SpO2 95%  BMI 24.41 kg/m2    GENERAL " APPEARANCE: alert and no distress     EYES: EOMI,  PERRL     HENT: ear canals and TM's normal and nose and mouth without ulcers or lesions     NECK: no adenopathy, no asymmetry, masses, or scars and thyroid normal to palpation     RESP: lungs clear to auscultation - no rales, rhonchi or wheezes     CV: regular rates and rhythm, normal S1 S2, no S3 or S4 and no murmur, click or rub     ABDOMEN:  soft, nontender, no HSM or masses and bowel sounds normal     MS: extremities normal- no gross deformities noted, no evidence of inflammation in joints, FROM in all extremities.     SKIN: no suspicious lesions or rashes     NEURO: Normal strength and tone, sensory exam grossly normal, mentation intact and speech normal     PSYCH: mentation appears normal. and affect normal/bright     LYMPHATICS: No cervical adenopathy    DIAGNOSTICS:     Results for orders placed or performed in visit on 08/23/18   Hemoglobin   Result Value Ref Range    Hemoglobin 14.8 13.3 - 17.7 g/dL   Platelet count   Result Value Ref Range    Platelet Count 198 150 - 450 10e9/L         IMPRESSION:   Reason for surgery/procedure: left inguinal hernia and left adductor tendonitis.  Diagnosis/reason for consult: pre op cardiac evaluation for upcoming surgeries for : HERNIORRHAPHY INGUINAL, LEFT and TENOTOMY HIP ADDUCTOR, LEFT for treatment of left inguinal hernia and left adductor tendonitis.    The proposed surgical procedure is considered INTERMEDIATE risk.    REVISED CARDIAC RISK INDEX  The patient has the following serious cardiovascular risks for perioperative complications such as (MI, PE, VFib and 3  AV Block):  No serious cardiac risks  INTERPRETATION: 0 risks: Class I (very low risk - 0.4% complication rate)    The patient has the following additional risks for perioperative complications:  No identified additional risks      ICD-10-CM    1. Preop general physical exam Z01.818    2. Adductor tendonitis M65.80    3. Left inguinal hernia K40.90         RECOMMENDATIONS:       Cardiovascular Risk    --Patient is to take all scheduled medications on the day of surgery.    APPROVAL GIVEN to proceed with proposed procedure, without further diagnostic evaluation       Signed Electronically by: Angy Hua MD    Copy of this evaluation report is provided to requesting physician.    [place] Preop Guidelines    Revised Cardiac Risk Index

## 2018-08-27 ENCOUNTER — ANESTHESIA EVENT (OUTPATIENT)
Dept: SURGERY | Facility: CLINIC | Age: 28
End: 2018-08-27
Payer: COMMERCIAL

## 2018-08-27 ENCOUNTER — OFFICE VISIT (OUTPATIENT)
Dept: SURGERY | Facility: CLINIC | Age: 28
End: 2018-08-27
Payer: COMMERCIAL

## 2018-08-27 DIAGNOSIS — K40.90 LEFT INGUINAL HERNIA: Primary | ICD-10-CM

## 2018-08-27 PROCEDURE — 99214 OFFICE O/P EST MOD 30 MIN: CPT | Performed by: SURGERY

## 2018-08-27 RX ORDER — CEFAZOLIN SODIUM 1 G/3ML
1 INJECTION, POWDER, FOR SOLUTION INTRAMUSCULAR; INTRAVENOUS SEE ADMIN INSTRUCTIONS
Status: CANCELLED | OUTPATIENT
Start: 2018-08-27

## 2018-08-27 RX ORDER — CEFAZOLIN SODIUM 2 G/100ML
2 INJECTION, SOLUTION INTRAVENOUS
Status: CANCELLED | OUTPATIENT
Start: 2018-08-27

## 2018-08-27 NOTE — LETTER
2018    Re: Wally Jalloh, : 1990    Patient presents to discuss upcoming left inguinal hernia repair with mesh.  He had multiple questions regarding the procedure including the mesh and overall technique.  We had a lengthy discussion regarding his surgical repair.  We discussed the procedure steps in detail.  Also reviewed the prosthesis being used.  This was all done with an anatomic model.  His questions were all answered to his satisfaction.  We are to proceed with surgery following day.    Garcia Rush MD

## 2018-08-27 NOTE — MR AVS SNAPSHOT
After Visit Summary   8/27/2018    Wally Jalloh    MRN: [id number]           Patient Information     Date Of Birth          1990        Visit Information        Provider Department      8/27/2018 1:15 PM Andrew Rush MD [facility] Hernia       Follow-ups after your visit        Your next 10 appointments already scheduled     Aug 28, 2018   Procedure with Andrew Rush MD   [facility] PeriOP Services (--)    [address]  [facility] [address] [phone]            Aug 28, 2018  1:00 PM CDT   [facility] Same Day Surgery with Andrew Rush MD, Ezequiel Bullock PA-C   [facility] ([facility])    [facility] ([facility])   [phone]              Who to contact     If you have questions or need follow up information about today's clinic visit or your schedule please contact [facility] directly at [phone].  Normal or non-critical lab and imaging results will be communicated to you by MyChart, letter or phone within 4 business days after the clinic has received the results. If you do not hear from us within 7 days, please contact the clinic through MyChart or phone. If you have a critical or abnormal lab result, we will notify you by phone as soon as possible.  Submit refill requests through Sendin[employer] or call your pharmacy and they will forward the refill request to us. Please allow 3 business days for your refill to be completed.          Additional Information About Your Visit        Care EveryWhere ID     This is your Care EveryWhere ID. This could be used by other organizations to access your [facility] medical records  [id number]         Blood Pressure from Last 3 Encounters:   08/23/18 122/72   07/18/18 133/86   02/01/17 128/86    Weight from Last 3 Encounters:   08/23/18  185 lb (83.9 kg)   07/18/18 182 lb (82.6 kg)   02/01/17 182 lb (82.6 kg)              Today, you had the following     No orders found for display       Primary Care Provider Office Phone # Fax #    Angy Tk Hua [phone] [address] [facility] [address]  [facility] [address]        Equal Access to Services     [facility] : Hadii aad ku hadasho Soomaali, waaxda luqadaha, qaybta kaalmada adeegyada, waxay idiin hayaan adeeg khnamelva lamoisesn . So [facility] [phone].    ATENCIÓN: Si habla espgeoff, tiene a reich disposición servicios gratuitos de asistencia lingüística. Llame al [phone].    We comply with applicable federal civil rights laws and [place] laws. We do not discriminate on the basis of race, color, national origin, age, disability, sex, sexual orientation, or gender identity.            Thank you!     Thank you for choosing [facility]  for your care. Our goal is always to provide you with excellent care. Hearing back from our patients is one way we can continue to improve our services. Please take a few minutes to complete the written survey that you may receive in the mail after your visit with us. Thank you!             Your Updated Medication List - Protect others around you: Learn how to safely use, store and throw away your medicines at www.disposemymeds.org.          This list is accurate as of 8/27/18  1:31 PM.  Always use your most recent med list.                   Brand Name Dispense Instructions for use Diagnosis    MULTI-VITAMINS Tabs      Take 1 tablet by mouth daily

## 2018-08-28 ENCOUNTER — ANESTHESIA (OUTPATIENT)
Dept: SURGERY | Facility: CLINIC | Age: 28
End: 2018-08-28
Payer: COMMERCIAL

## 2018-08-28 ENCOUNTER — OFFICE VISIT (OUTPATIENT)
Dept: SURGERY | Facility: PHYSICIAN GROUP | Age: 28
End: 2018-08-28
Payer: COMMERCIAL

## 2018-08-28 ENCOUNTER — HOSPITAL ENCOUNTER (OUTPATIENT)
Facility: CLINIC | Age: 28
Discharge: HOME | End: 2018-08-28
Attending: SURGERY | Admitting: SURGERY
Payer: COMMERCIAL

## 2018-08-28 VITALS
TEMPERATURE: 97.7 F | RESPIRATION RATE: 18 BRPM | HEIGHT: 73 IN | WEIGHT: 178.7 LBS | BODY MASS INDEX: 23.68 KG/M2 | SYSTOLIC BLOOD PRESSURE: 139 MMHG | OXYGEN SATURATION: 100 % | DIASTOLIC BLOOD PRESSURE: 83 MMHG

## 2018-08-28 DIAGNOSIS — Z98.890 S/P HERNIA REPAIR: Primary | ICD-10-CM

## 2018-08-28 DIAGNOSIS — Z87.19 S/P HERNIA REPAIR: Primary | ICD-10-CM

## 2018-08-28 PROCEDURE — 49505 PRP I/HERN INIT REDUC >5 YR: CPT | Mod: AS | Performed by: PHYSICIAN ASSISTANT

## 2018-08-28 PROCEDURE — 25000566 ZZH SEVOFLURANE, EA 15 MIN: Performed by: SURGERY

## 2018-08-28 PROCEDURE — 27210995 ZZH RX 272: Performed by: SURGERY

## 2018-08-28 PROCEDURE — 37000008 ZZH ANESTHESIA TECHNICAL FEE, 1ST 30 MIN: Performed by: SURGERY

## 2018-08-28 PROCEDURE — 71000027 ZZH RECOVERY PHASE 2 EACH 15 MINS: Performed by: SURGERY

## 2018-08-28 PROCEDURE — 37000009 ZZH ANESTHESIA TECHNICAL FEE, EACH ADDTL 15 MIN: Performed by: SURGERY

## 2018-08-28 PROCEDURE — 25000128 H RX IP 250 OP 636: Performed by: NURSE ANESTHETIST, CERTIFIED REGISTERED

## 2018-08-28 PROCEDURE — 71000013 ZZH RECOVERY PHASE 1 LEVEL 1 EA ADDTL HR: Performed by: SURGERY

## 2018-08-28 PROCEDURE — 25000132 ZZH RX MED GY IP 250 OP 250 PS 637: Performed by: PHYSICIAN ASSISTANT

## 2018-08-28 PROCEDURE — 71000012 ZZH RECOVERY PHASE 1 LEVEL 1 FIRST HR: Performed by: SURGERY

## 2018-08-28 PROCEDURE — 27210794 ZZH OR GENERAL SUPPLY STERILE: Performed by: SURGERY

## 2018-08-28 PROCEDURE — 40000170 ZZH STATISTIC PRE-PROCEDURE ASSESSMENT II: Performed by: SURGERY

## 2018-08-28 PROCEDURE — 36000052 ZZH SURGERY LEVEL 2 EA 15 ADDTL MIN: Performed by: SURGERY

## 2018-08-28 PROCEDURE — 25000125 ZZHC RX 250: Performed by: SURGERY

## 2018-08-28 PROCEDURE — 25000125 ZZHC RX 250: Performed by: NURSE ANESTHETIST, CERTIFIED REGISTERED

## 2018-08-28 PROCEDURE — C1781 MESH (IMPLANTABLE): HCPCS | Performed by: SURGERY

## 2018-08-28 PROCEDURE — 49505 PRP I/HERN INIT REDUC >5 YR: CPT | Performed by: SURGERY

## 2018-08-28 PROCEDURE — 25000128 H RX IP 250 OP 636: Performed by: PHYSICIAN ASSISTANT

## 2018-08-28 PROCEDURE — 36000050 ZZH SURGERY LEVEL 2 1ST 30 MIN: Performed by: SURGERY

## 2018-08-28 DEVICE — MESH PROGRIP 4.7X3" PARIETEX LEFT TEM1208GL: Type: IMPLANTABLE DEVICE | Site: GROIN | Status: FUNCTIONAL

## 2018-08-28 RX ORDER — CEFAZOLIN SODIUM 2 G/100ML
2 INJECTION, SOLUTION INTRAVENOUS
Status: COMPLETED | OUTPATIENT
Start: 2018-08-28 | End: 2018-08-28

## 2018-08-28 RX ORDER — CEFAZOLIN SODIUM 1 G/3ML
1 INJECTION, POWDER, FOR SOLUTION INTRAMUSCULAR; INTRAVENOUS SEE ADMIN INSTRUCTIONS
Status: DISCONTINUED | OUTPATIENT
Start: 2018-08-28 | End: 2018-08-28 | Stop reason: HOSPADM

## 2018-08-28 RX ORDER — HYDROMORPHONE HYDROCHLORIDE 1 MG/ML
.3-.5 INJECTION, SOLUTION INTRAMUSCULAR; INTRAVENOUS; SUBCUTANEOUS EVERY 10 MIN PRN
Status: DISCONTINUED | OUTPATIENT
Start: 2018-08-28 | End: 2018-08-28 | Stop reason: HOSPADM

## 2018-08-28 RX ORDER — ALBUTEROL SULFATE 0.83 MG/ML
2.5 SOLUTION RESPIRATORY (INHALATION) EVERY 4 HOURS PRN
Status: DISCONTINUED | OUTPATIENT
Start: 2018-08-28 | End: 2018-08-28 | Stop reason: HOSPADM

## 2018-08-28 RX ORDER — ONDANSETRON 2 MG/ML
4 INJECTION INTRAMUSCULAR; INTRAVENOUS EVERY 30 MIN PRN
Status: DISCONTINUED | OUTPATIENT
Start: 2018-08-28 | End: 2018-08-28 | Stop reason: HOSPADM

## 2018-08-28 RX ORDER — SODIUM CHLORIDE, SODIUM LACTATE, POTASSIUM CHLORIDE, CALCIUM CHLORIDE 600; 310; 30; 20 MG/100ML; MG/100ML; MG/100ML; MG/100ML
INJECTION, SOLUTION INTRAVENOUS CONTINUOUS
Status: DISCONTINUED | OUTPATIENT
Start: 2018-08-28 | End: 2018-08-28 | Stop reason: HOSPADM

## 2018-08-28 RX ORDER — ONDANSETRON 4 MG/1
4 TABLET, ORALLY DISINTEGRATING ORAL EVERY 30 MIN PRN
Status: DISCONTINUED | OUTPATIENT
Start: 2018-08-28 | End: 2018-08-28 | Stop reason: HOSPADM

## 2018-08-28 RX ORDER — FENTANYL CITRATE 50 UG/ML
25-50 INJECTION, SOLUTION INTRAMUSCULAR; INTRAVENOUS
Status: DISCONTINUED | OUTPATIENT
Start: 2018-08-28 | End: 2018-08-28 | Stop reason: HOSPADM

## 2018-08-28 RX ORDER — ACETAMINOPHEN 325 MG/1
975 TABLET ORAL ONCE
Status: COMPLETED | OUTPATIENT
Start: 2018-08-28 | End: 2018-08-28

## 2018-08-28 RX ORDER — MEPERIDINE HYDROCHLORIDE 25 MG/ML
12.5 INJECTION INTRAMUSCULAR; INTRAVENOUS; SUBCUTANEOUS
Status: DISCONTINUED | OUTPATIENT
Start: 2018-08-28 | End: 2018-08-28 | Stop reason: HOSPADM

## 2018-08-28 RX ORDER — BUPIVACAINE HYDROCHLORIDE AND EPINEPHRINE 5; 5 MG/ML; UG/ML
INJECTION, SOLUTION PERINEURAL PRN
Status: DISCONTINUED | OUTPATIENT
Start: 2018-08-28 | End: 2018-08-28 | Stop reason: HOSPADM

## 2018-08-28 RX ORDER — OXYCODONE AND ACETAMINOPHEN 5; 325 MG/1; MG/1
1-2 TABLET ORAL EVERY 4 HOURS PRN
Qty: 30 TABLET | Refills: 0 | Status: SHIPPED | OUTPATIENT
Start: 2018-08-28 | End: 2018-11-01

## 2018-08-28 RX ORDER — NALOXONE HYDROCHLORIDE 0.4 MG/ML
.1-.4 INJECTION, SOLUTION INTRAMUSCULAR; INTRAVENOUS; SUBCUTANEOUS
Status: DISCONTINUED | OUTPATIENT
Start: 2018-08-28 | End: 2018-08-28 | Stop reason: HOSPADM

## 2018-08-28 RX ORDER — AMOXICILLIN 250 MG
1-2 CAPSULE ORAL 2 TIMES DAILY PRN
Qty: 60 TABLET | Refills: 1 | Status: SHIPPED | OUTPATIENT
Start: 2018-08-28 | End: 2018-11-01

## 2018-08-28 RX ORDER — PROPOFOL 10 MG/ML
INJECTION, EMULSION INTRAVENOUS CONTINUOUS PRN
Status: DISCONTINUED | OUTPATIENT
Start: 2018-08-28 | End: 2018-08-28

## 2018-08-28 RX ORDER — MAGNESIUM HYDROXIDE 1200 MG/15ML
LIQUID ORAL PRN
Status: DISCONTINUED | OUTPATIENT
Start: 2018-08-28 | End: 2018-08-28 | Stop reason: HOSPADM

## 2018-08-28 RX ORDER — SODIUM CHLORIDE, SODIUM LACTATE, POTASSIUM CHLORIDE, CALCIUM CHLORIDE 600; 310; 30; 20 MG/100ML; MG/100ML; MG/100ML; MG/100ML
INJECTION, SOLUTION INTRAVENOUS CONTINUOUS PRN
Status: DISCONTINUED | OUTPATIENT
Start: 2018-08-28 | End: 2018-08-28

## 2018-08-28 RX ORDER — LIDOCAINE HYDROCHLORIDE 20 MG/ML
INJECTION, SOLUTION INFILTRATION; PERINEURAL PRN
Status: DISCONTINUED | OUTPATIENT
Start: 2018-08-28 | End: 2018-08-28

## 2018-08-28 RX ORDER — PROPOFOL 10 MG/ML
INJECTION, EMULSION INTRAVENOUS PRN
Status: DISCONTINUED | OUTPATIENT
Start: 2018-08-28 | End: 2018-08-28

## 2018-08-28 RX ORDER — OXYCODONE AND ACETAMINOPHEN 5; 325 MG/1; MG/1
1-2 TABLET ORAL
Status: COMPLETED | OUTPATIENT
Start: 2018-08-28 | End: 2018-08-28

## 2018-08-28 RX ORDER — FENTANYL CITRATE 50 UG/ML
INJECTION, SOLUTION INTRAMUSCULAR; INTRAVENOUS PRN
Status: DISCONTINUED | OUTPATIENT
Start: 2018-08-28 | End: 2018-08-28

## 2018-08-28 RX ORDER — ACETAMINOPHEN 650 MG/1
650 SUPPOSITORY RECTAL EVERY 4 HOURS PRN
Status: DISCONTINUED | OUTPATIENT
Start: 2018-08-28 | End: 2018-08-28 | Stop reason: HOSPADM

## 2018-08-28 RX ADMIN — ACETAMINOPHEN 975 MG: 325 TABLET, FILM COATED ORAL at 11:52

## 2018-08-28 RX ADMIN — SODIUM CHLORIDE, POTASSIUM CHLORIDE, SODIUM LACTATE AND CALCIUM CHLORIDE: 600; 310; 30; 20 INJECTION, SOLUTION INTRAVENOUS at 12:32

## 2018-08-28 RX ADMIN — OXYCODONE HYDROCHLORIDE AND ACETAMINOPHEN 1 TABLET: 5; 325 TABLET ORAL at 14:50

## 2018-08-28 RX ADMIN — PROPOFOL 50 MCG/KG/MIN: 10 INJECTION, EMULSION INTRAVENOUS at 12:52

## 2018-08-28 RX ADMIN — SODIUM CHLORIDE, POTASSIUM CHLORIDE, SODIUM LACTATE AND CALCIUM CHLORIDE: 600; 310; 30; 20 INJECTION, SOLUTION INTRAVENOUS at 13:15

## 2018-08-28 RX ADMIN — CEFAZOLIN SODIUM 2 G: 2 INJECTION, SOLUTION INTRAVENOUS at 12:55

## 2018-08-28 RX ADMIN — FENTANYL CITRATE 100 MCG: 50 INJECTION, SOLUTION INTRAMUSCULAR; INTRAVENOUS at 12:45

## 2018-08-28 RX ADMIN — LIDOCAINE HYDROCHLORIDE 80 MG: 20 INJECTION, SOLUTION INFILTRATION; PERINEURAL at 12:34

## 2018-08-28 RX ADMIN — PROPOFOL 200 MG: 10 INJECTION, EMULSION INTRAVENOUS at 12:34

## 2018-08-28 RX ADMIN — MIDAZOLAM 2 MG: 1 INJECTION INTRAMUSCULAR; INTRAVENOUS at 12:30

## 2018-08-28 RX ADMIN — HYDROMORPHONE HYDROCHLORIDE 0.5 MG: 1 INJECTION, SOLUTION INTRAMUSCULAR; INTRAVENOUS; SUBCUTANEOUS at 13:51

## 2018-08-28 ASSESSMENT — LIFESTYLE VARIABLES: TOBACCO_USE: 0

## 2018-08-28 ASSESSMENT — ENCOUNTER SYMPTOMS: ORTHOPNEA: 0

## 2018-08-28 NOTE — ANESTHESIA POSTPROCEDURE EVALUATION
Patient: Wally Jalloh    Procedure(s):  LEFT INGUINAL HERNIA REPAIR WITH MESH AND  LEFT ADDUCTOR TENDON LENGTHENING - Wound Class: I-Clean   - Wound Class: I-Clean    Diagnosis:LEFT INGUINAL HERNIA, LEFT ADDUCTOR TENDONOPATHY   Diagnosis Additional Information: No value filed.    Anesthesia Type:  General, LMA    Note:  Anesthesia Post Evaluation    Patient location during evaluation: PACU  Patient participation: Able to fully participate in evaluation  Level of consciousness: awake  Pain management: adequate  Airway patency: patent  Cardiovascular status: acceptable  Respiratory status: acceptable  Hydration status: acceptable  PONV: controlled     Anesthetic complications: None          Last vitals:  Vitals:    08/28/18 1445 08/28/18 1450 08/28/18 1500   BP: 141/77  139/83   Resp: 18 18 18   Temp: 36.3  C (97.3  F)  36.5  C (97.7  F)   SpO2: 100% 100% 100%         Electronically Signed By: Fadia Huang MD  August 28, 2018  5:40 PM

## 2018-08-28 NOTE — IP AVS SNAPSHOT
[place] [address] [place] [address]    Phone:  [phone]                                       After Visit Summary   8/28/2018    Wally Jalloh    MRN: [id number]           After Visit Summary Signature Page     I have received my discharge instructions, and my questions have been answered. I have discussed any challenges I see with this plan with the nurse or doctor.    ..........................................................................................................................................  Patient/Patient Representative Signature      ..........................................................................................................................................  Patient Representative Print Name and Relationship to Patient    ..................................................               ................................................  Date                                            Time    ..........................................................................................................................................  Reviewed by Signature/Title    ...................................................              ..............................................  Date                                                            Time          22EPIC Rev 08/18

## 2018-08-28 NOTE — OP NOTE
PREOPERATIVE DIAGNOSIS: Left  inguinal hernia.   POSTOPERATIVE DIAGNOSIS: Left inguinal hernia.   PROCEDURE: Left  inguinal hernia repair with mesh.   SURGEON: Andrew Rush MD   ASSISTANT: Karen Mark PA-C, Physician assistant first assistant was necessary during this procedure due to expertise in patient positioning, prepping, incisional opening, retraction, exposure, and suctioning.  ANESTHESIA: General   ESTIMATED BLOOD LOSS: 2 mL.   DRAINS: None.   COMPLICATIONS: None.   SPECIMENS: None.   OPERATIVE INDICATIONS: Mr. Jalloh has a symptomatic Left  inguinal hernia. Options were discussed and it was elected to proceed with open repair. Potential risks of bleeding, infection, neurovascular injury to the vas deferens or testicle, recurrent hernia, chronic pain were all reviewed in detail and he wished to proceed.   DESCRIPTION OF PROCEDURE: After informed consent was obtained, the patient was taken to the operating room and placed supine on the operating table. Following the induction of adequate general anesthetic, the patient's Left  groin was shaved, prepped and draped in the usual fashion. A timeout was then completed and IV antibiotics were administered. A standard groin incision was then made and dissection was carried down to the external oblique fascia. This was sharply incised and the inguinal canal was exposed. The spermatic cord and its contents were mobilized and encircled with a Penrose drain. A moderate-sized direct hernia was present. Hernia was easily reduced. Anatomic side-specific Progrip Mesh was then introduced.  The flap to the mesh had been folded back.  The mesh was then placed in the inguinal canal.  The flap was then brought around the spermatic cord and secured into position.  The lateral tail of the mesh was placed up and underneath the fascia of the external oblique.  The spermatic cord was then released and the fascia of the external oblique was closed using running 0 Vicryl stitch.  The operative area was infiltrated with  local anesthetic. The deep subcutaneous was closed with 3-0 Vicryl stitch. Skin incision was closed with subcuticular 4-0 Monocryl stitch. Benzoin and Steri-Strips were applied. Needle and sponge counts were correct. The patient tolerated this well. Remainder of surgery was performed by irtho service under separate operative note.   SHE LONG MD

## 2018-08-28 NOTE — ANESTHESIA PREPROCEDURE EVALUATION
Anesthesia Evaluation     . Pt has had prior anesthetic.     No history of anesthetic complications          ROS/MED HX    ENT/Pulmonary:      (-) tobacco use, asthma and sleep apnea   Neurologic:       Cardiovascular:        (-) BALLARD and orthopnea/PND   METS/Exercise Tolerance:  >4 METS   Hematologic:         Musculoskeletal:         GI/Hepatic: Comment: H/o pancreatitis        (-) GERD   Renal/Genitourinary:         Endo:         Psychiatric:         Infectious Disease:         Malignancy:         Other:                     Physical Exam  Normal systems: dental    Airway   Mallampati: II  TM distance: >3 FB  Neck ROM: full    Dental     Cardiovascular   Rhythm and rate: regular      Pulmonary    breath sounds clear to auscultation                    Anesthesia Plan      History & Physical Review  History and physical reviewed and following examination; no interval change.    ASA Status:  2 .        Plan for General and LMA   PONV prophylaxis:  Ondansetron (or other 5HT-3) and Aprepitant       Postoperative Care      Consents  Anesthetic plan, risks, benefits and alternatives discussed with:  Patient..                          .  Procedure: Procedure(s):  HERNIORRHAPHY INGUINAL  TENOTOMY HIP ADDUCTOR  Preop diagnosis: LEFT INGUINAL HERNIA, LEFT ADDUCTOR TENDONOPATHY     Allergies   Allergen Reactions     No Known Allergies      Past Medical History:   Diagnosis Date     Inguinal hernia      Pancreatitis      Past Surgical History:   Procedure Laterality Date     GI SURGERY      ERCP X2     HERNIA REPAIR      right inguinal     Social History   Substance Use Topics     Smoking status: Light Tobacco Smoker     Smokeless tobacco: Never Used      Comment: Occ Cigar      Alcohol use 0.0 oz/week     0 Standard drinks or equivalent per week     Prior to Admission medications    Medication Sig Start Date End Date Taking? Authorizing Provider   Multiple Vitamin (MULTI-VITAMINS) TABS Take 1 tablet by mouth daily     Reported, Patient     Current Facility-Administered Medications Ordered in Epic   Medication Dose Route Frequency Last Rate Last Dose     ceFAZolin (ANCEF) 1 g vial to attach to  ml bag for ADULT or 50 ml bag for PEDS  1 g Intravenous See Admin Instructions         ceFAZolin (ANCEF) intermittent infusion 2 g in 100 mL dextrose PRE-MIX  2 g Intravenous Pre-Op/Pre-procedure x 1 dose         No current [facility]-ordered outpatient prescriptions on file.       Wt Readings from Last 1 Encounters:   08/28/18 81.1 kg (178 lb 11.2 oz)     Temp Readings from Last 1 Encounters:   08/28/18 35.8  C (96.4  F) (Oral)     BP Readings from Last 6 Encounters:   08/28/18 132/81   08/23/18 122/72   07/18/18 133/86   02/01/17 128/86   10/14/15 138/72     Pulse Readings from Last 4 Encounters:   08/23/18 69   07/18/18 54   10/14/15 72     Resp Readings from Last 1 Encounters:   08/28/18 20     SpO2 Readings from Last 1 Encounters:   08/28/18 98%     No results for input(s): NA, POTASSIUM, CHLORIDE, CO2, ANIONGAP, GLC, BUN, CR, JEFFERSON in the last 89917 hours.  No results for input(s): AST, ALT, ALKPHOS, BILITOTAL, LIPASE in the last 68596 hours.  Recent Labs   Lab Test  08/23/18   0858   HGB  14.8   PLT  198     No results for input(s): ABO, RH in the last 61808 hours.  No results for input(s): INR, PTT in the last 40586 hours.   No results for input(s): TROPI in the last 02133 hours.  No results for input(s): PH, PCO2, PO2, HCO3 in the last 64719 hours.  No results for input(s): HCG in the last 28159 hours.  No results found for this or any previous visit (from the past 744 hour(s)).    RECENT LABS:   ECG:   ECHO:

## 2018-08-28 NOTE — OP NOTE
Procedure Date: 08/28/2018      PREOPERATIVE DIAGNOSIS:  Left adductor tendinopathy.      POSTOPERATIVE DIAGNOSIS:  Left adductor tendinopathy.      PROCEDURE:  Left adductor tendon lengthening.  This is a combined case with Dr. Garcia Rush.  He will dictate his portion of the procedure.      ANESTHESIA TYPE:  General with local.      BLOOD LOSS:  5 mL.      SURGEON:  Reji Chamorro MD      FIRST ASSISTANT:  Naina Barba MD      DESCRIPTION OF PROCEDURE:  After identification of the patient, correct extremity, and review of informed consent, the patient was brought to the operating room where general anesthesia was induced.  Dr. Rush then proceeded with his portion of the procedure.  After conclusion, the patient was repositioned into the frogleg supine position with care taken to pad all bony prominences.  Left adductor region was then prepped and draped in the usual sterile manner.        A 2 cm incision was then made in line with the adductor tendon group.  Dissection was taken down sharply through subcutaneous tissues and the deep fascia was exposed.  The deep fascia was then split transverse to the incision to expose the myotendinous junction.  An Allis clamp was placed on the distal portion of the tendon at the myotendinous junction and the tendinous portion was then released using a 15 blade scalpel with care taken to preserve the underlying muscle fibers.  This allowed for lengthening of the adductor tendon group by 1.5 cm.  The distal portion of the tendon was then stitched into the deep fascia using 2-0 Vicryl distally to prevent further lengthening.  The wound was then irrigated with copious normal saline via bulb syringe.  The deep fascia was then closed with 2-0 Vicryl.  The subcutaneous tissues were closed with 4-0 Monocryl.  A running 4-0 Monocryl intercuticular stitch was placed.  Steri-Strips were placed in the epidermis.  The wound site was then infiltrated with 0.5% Marcaine without epinephrine.   A total of 10 mL was used.  A sterile dressing was then applied.  The patient was then recovered briefly in the operating room and transferred to the PACU for further recovery.  The patient tolerated the procedure well.  There were no known complications.      Naina Barba PA-C was present for the entire portion of the procedure and her assistance was critical in patient positioning, prepping and draping, exposure, deep wound closure, superficial wound closure, dressing placement and patient transfer.         RADHA RBENNAN MD             D: 2018   T: 2018   MT: JACQUIE      Name:     KEREN XIAO   MRN:      [id number]        Account:        [id number]   :      1990           Procedure Date: 2018      Document: [id number]

## 2018-08-28 NOTE — DISCHARGE INSTRUCTIONS
CA[facility] discharge instructions:  1.  Weight bearing status: WBAT  2.  Remove dressing in 48 hours. Then dry dressing changes 5-6 times per day.   3.  May shower with the wound covered after dressing removed.  4.  PT to start within a week from surgery.  5.  Pain medications as directed.  6.  Ice 2-3 x per day for the first 2-6 weeks.  7.  Brace: N/A  8.  Follow up with Dr. Chamorro in 10-14 days for wound check.  Please call for an appt. If one has not been previously scheduled.   9.  Please contact Zehra Edwards with any questions or concerns:[phone].     Same Day Surgery Discharge Instructions for  Sedation and General Anesthesia       It's not unusual to feel dizzy, light-headed or faint for up to 24 hours after surgery or while taking pain medication.  If you have these symptoms: sit for a few minutes before standing and have someone assist you when you get up to walk or use the bathroom.      You should rest and relax for the next 24 hours. We recommend you make arrangements to have an adult stay with you for at least 24 hours after your discharge.  Avoid hazardous and strenuous activity.      DO NOT DRIVE any vehicle or operate mechanical equipment for 24 hours following the end of your surgery.  Even though you may feel normal, your reactions may be affected by the medication you have received.      Do not drink alcoholic beverages for 24 hours following surgery.       Slowly progress to your regular diet as you feel able. It's not unusual to feel nauseated and/or vomit after receiving anesthesia.  If you develop these symptoms, drink clear liquids (apple juice, ginger ale, broth, 7-up, etc. ) until you feel better.  If your nausea and vomiting persists for 24 hours, please notify your surgeon.        All narcotic pain medications, along with inactivity and anesthesia, can cause constipation. Drinking plenty of liquids and increasing fiber intake will help.      For any questions of a medical nature, call your  surgeon.      Do not make important decisions for 24 hours.      If you had general anesthesia, you may have a sore throat for a couple of days related to the breathing tube used during surgery.  You may use Cepacol lozenges to help with this discomfort.  If it worsens or if you develop a fever, contact your surgeon.       If you feel your pain is not well managed with the pain medications prescribed by your surgeon, please contact your surgeon's office to let them know so they can address your concerns.       Today you were given 975 mg of Tylenol at 12:00. The recommended daily maximum dose is 4000 mg.     [facility]  Discharge Instructions: Post-Operative Open Inguinal Hernia Repair    ACTIVITY    Take frequent, short walks and increase your activity gradually.      Avoid strenuous physical activity or heavy lifting greater than 15 lbs. for 3-4 weeks.  You may climb stairs.    You may drive without restrictions when you are not using any prescription pain medication and feel comfortable in a car.    You may return to work/school when you are comfortable without any prescription pain medication.    ACTIVITY ADVANCEMENT SCHEDULE:  Begin day 1 isometric groin stretches (e.g. Butterfly stretch)    For the next 2 weeks low impact straight line activity (ie: bike, elliptical, swim/pool therapy (after 10-14 days))    Begin week 3 - jog, weight lifting at lower weights higher reps    Begin week 4 - run, cutting, weight lifting - unlimited.  Sports activities ok.    Return to full activity between 4-6 weeks       WOUND CARE    You may remove your outer clear bandage and gauze and shower 48 hours after the surgery.  Pat your incisions dry and leave it open to air.  Re-apply dressing (Band-Aids or gauze/tape) as needed for comfort or drainage.    You may have steri-strips (looks like white tape) on your incisions.  You may peel off the steri-strips 2 weeks after your surgery if they  have not peeled off on their own.     Do not soak your incisions in a tub or pool for 2 weeks.     Do not apply any lotions, creams, or ointments to your incisions.    A ridge under your incisions is normal and will gradually resolve.    DIET    Start with liquids, then gradually resume your regular diet as tolerated.     Drink plenty of fluids to stay hydrated.    PAIN    Expect some tenderness and discomfort at the incision site(s).  Use the prescribed pain medication at your discretion.  Expect gradual resolution of your pain over several days.    You may take ibuprofen with food (unless you have been told not to) instead of or in addition to your prescribed pain medication.  If you are taking the Percocet, do not take any additional acetaminophen/APAP/Tylenol.    Do not drink alcohol or drive while you are taking pain medications.    You may apply ice to your incisions in 20 minute intervals as needed for the next 48 hours.  After that time, consider switching to heat if you prefer.    EXPECTATIONS    You can expect some swelling and bruising involving the scrotum and/or penis as well as numbness.  These symptoms are expected and should gradually resolve in the next few days.  You may use ice to help with the swelling and try placing a rolled hand towel below your scrotum to help alleviate scrotal discomfort.  If you develop significant testicular or penile pain, please call our office and speak with a nurse.    Pain medications can cause constipation.  Limit use when possible.  Take over the counter stool softener/stimulant, such as Colace or Senna, 1-2 times a day with plenty of water.  You may take a mild over the counter laxative, such as Miralax or a suppository, as needed. You may take 1 oz. (2 tablespoons) Milk of Magnesia the evening following surgery to encourage bowel movement.  You may discontinue these medications once you are having regular bowel movements and/or are no longer taking your narcotic  pain medication    FOLLOW UP    Our office will contact you in approximately 2 weeks to check on your progress and answer any questions you may have.  If you are doing well, you will not need to return for a follow up appointment until 4 weeks postoperatively.  If any concerns are identified over the phone, we will help you make an appointment to see a provider sooner.    Please schedule an appointment with Dr. Rush for 4 weeks from now. Call his office at [phone] to schedule.    If you have not received a phone call, have any questions or concerns, or would like to be seen, please call us at [phone] and ask to speak with our nurse.  We are located at [address] [place] [address].    CALL OUR OFFICE [phone] IF YOU HAVE:     Chills or fever above 101 F.    Increased redness, warmth, or drainage at your incisions.    Significant bleeding.    Pain not relieved by your pain medication or rest.    Increasing pain after the first 48 hours.    Any other concerns or questions.      **If you have questions or concerns about your procedure,  call Dr. Rush at [phone]**    **If you have questions or concerns about your procedure,  call Dr. Chamorro at [phone]**

## 2018-08-28 NOTE — ANESTHESIA CARE TRANSFER NOTE
Patient: Wally Jalloh    Procedure(s):  LEFT INGUINAL HERNIA REPAIR WITH MESH AND  LEFT ADDUCTOR TENDON LENGTHENING - Wound Class: I-Clean   - Wound Class: I-Clean    Diagnosis: LEFT INGUINAL HERNIA, LEFT ADDUCTOR TENDONOPATHY   Diagnosis Additional Information: No value filed.    Anesthesia Type:   General, LMA     Note:  Airway :LMA  Patient transferred to:PACU  Comments: Pt sv good tidal volumes, awake LMA removed prepare to transfer to PACU,  Report to PACU RN.  VSS transfer careHandoff Report: Identifed the Patient, Identified the Reponsible Provider, Reviewed the pertinent medical history, Discussed the surgical course, Reviewed Intra-OP anesthesia mangement and issues during anesthesia, Set expectations for post-procedure period and Allowed opportunity for questions and acknowledgement of understanding      Vitals: (Last set prior to Anesthesia Care Transfer)    CRNA VITALS  8/28/2018 1328 - 8/28/2018 1408      8/28/2018             Pulse: 75    SpO2: 99 %    Resp Rate (set): 10                Electronically Signed By: UNRULY Ortega CRNA  August 28, 2018  2:08 PM

## 2018-08-28 NOTE — IP AVS SNAPSHOT
MRN:[id number]                      After Visit Summary   8/28/2018    Wally Jalloh    MRN: [id number]           Thank you!     Thank you for choosing [facility] for your care. Our goal is always to provide you with excellent care. Hearing back from our patients is one way we can continue to improve our services. Please take a few minutes to complete the written survey that you may receive in the mail after you visit with us. Thank you!        Patient Information     Date Of Birth          1990        About your hospital stay     You were admitted on:  August 28, 2018 You last received care in the:  [facility] PACU    You were discharged on:  August 28, 2018       Who to Call     For medical emergencies, please call 911.  For non-urgent questions about your medical care, please call your primary care provider or clinic, [phone]  For questions related to your surgery, please call your surgery clinic        Attending Provider     Provider Specialty    Andrew Rush MD General Surgery       Primary Care Provider Office Phone # Fax #    Angy Tk Hua [phone]      Further instructions from your care team       CAW discharge instructions:  1.  Weight bearing status: WBAT  2.  Remove dressing in 48 hours. Then dry dressing changes 5-6 times per day.   3.  May shower with the wound covered after dressing removed.  4.  PT to start within a week from surgery.  5.  Pain medications as directed.  6.  Ice 2-3 x per day for the first 2-6 weeks.  7.  Brace: N/A  8.  Follow up with Dr. Chamorro in 10-14 days for wound check.  Please call for an appt. If one has not been previously scheduled.   9.  Please contact [facility] with any questions or concerns:[phone].     Same Day Surgery Discharge Instructions for  Sedation and General Anesthesia       It's not unusual to feel dizzy, light-headed or faint for up to 24 hours after surgery or while taking pain  medication.  If you have these symptoms: sit for a few minutes before standing and have someone assist you when you get up to walk or use the bathroom.      You should rest and relax for the next 24 hours. We recommend you make arrangements to have an adult stay with you for at least 24 hours after your discharge.  Avoid hazardous and strenuous activity.      DO NOT DRIVE any vehicle or operate mechanical equipment for 24 hours following the end of your surgery.  Even though you may feel normal, your reactions may be affected by the medication you have received.      Do not drink alcoholic beverages for 24 hours following surgery.       Slowly progress to your regular diet as you feel able. It's not unusual to feel nauseated and/or vomit after receiving anesthesia.  If you develop these symptoms, drink clear liquids (apple juice, ginger ale, broth, 7-up, etc. ) until you feel better.  If your nausea and vomiting persists for 24 hours, please notify your surgeon.        All narcotic pain medications, along with inactivity and anesthesia, can cause constipation. Drinking plenty of liquids and increasing fiber intake will help.      For any questions of a medical nature, call your surgeon.      Do not make important decisions for 24 hours.      If you had general anesthesia, you may have a sore throat for a couple of days related to the breathing tube used during surgery.  You may use Cepacol lozenges to help with this discomfort.  If it worsens or if you develop a fever, contact your surgeon.       If you feel your pain is not well managed with the pain medications prescribed by your surgeon, please contact your surgeon's office to let them know so they can address your concerns.       Today you were given 975 mg of Tylenol at 12:00. The recommended daily maximum dose is 4000 mg.     [facility]  Discharge Instructions: Post-Operative Open Inguinal Hernia  Repair    ACTIVITY    Take frequent, short walks and increase your activity gradually.      Avoid strenuous physical activity or heavy lifting greater than 15 lbs. for 3-4 weeks.  You may climb stairs.    You may drive without restrictions when you are not using any prescription pain medication and feel comfortable in a car.    You may return to work/school when you are comfortable without any prescription pain medication.    ACTIVITY ADVANCEMENT SCHEDULE:  Begin day 1 isometric groin stretches (e.g. Butterfly stretch)    For the next 2 weeks low impact straight line activity (ie: bike, elliptical, swim/pool therapy (after 10-14 days))    Begin week 3 - jog, weight lifting at lower weights higher reps    Begin week 4 - run, cutting, weight lifting - unlimited.  Sports activities ok.    Return to full activity between 4-6 weeks       WOUND CARE    You may remove your outer clear bandage and gauze and shower 48 hours after the surgery.  Pat your incisions dry and leave it open to air.  Re-apply dressing (Band-Aids or gauze/tape) as needed for comfort or drainage.    You may have steri-strips (looks like white tape) on your incisions.  You may peel off the steri-strips 2 weeks after your surgery if they have not peeled off on their own.     Do not soak your incisions in a tub or pool for 2 weeks.     Do not apply any lotions, creams, or ointments to your incisions.    A ridge under your incisions is normal and will gradually resolve.    DIET    Start with liquids, then gradually resume your regular diet as tolerated.     Drink plenty of fluids to stay hydrated.    PAIN    Expect some tenderness and discomfort at the incision site(s).  Use the prescribed pain medication at your discretion.  Expect gradual resolution of your pain over several days.    You may take ibuprofen with food (unless you have been told not to) instead of or in addition to your prescribed pain medication.  If you are taking the Percocet, do not  take any additional acetaminophen/APAP/Tylenol.    Do not drink alcohol or drive while you are taking pain medications.    You may apply ice to your incisions in 20 minute intervals as needed for the next 48 hours.  After that time, consider switching to heat if you prefer.    EXPECTATIONS    You can expect some swelling and bruising involving the scrotum and/or penis as well as numbness.  These symptoms are expected and should gradually resolve in the next few days.  You may use ice to help with the swelling and try placing a rolled hand towel below your scrotum to help alleviate scrotal discomfort.  If you develop significant testicular or penile pain, please call our office and speak with a nurse.    Pain medications can cause constipation.  Limit use when possible.  Take over the counter stool softener/stimulant, such as Colace or Senna, 1-2 times a day with plenty of water.  You may take a mild over the counter laxative, such as Miralax or a suppository, as needed. You may take 1 oz. (2 tablespoons) Milk of Magnesia the evening following surgery to encourage bowel movement.  You may discontinue these medications once you are having regular bowel movements and/or are no longer taking your narcotic pain medication    FOLLOW UP    Our office will contact you in approximately 2 weeks to check on your progress and answer any questions you may have.  If you are doing well, you will not need to return for a follow up appointment until 4 weeks postoperatively.  If any concerns are identified over the phone, we will help you make an appointment to see a provider sooner.    Please schedule an appointment with Dr. Rush for 4 weeks from now. Call his office at [phone] to schedule.    If you have not received a phone call, have any questions or concerns, or would like to be seen, please call us at [phone] and ask to speak with our nurse.  We are located at [address] [place] [address]  "[id number].    CALL OUR OFFICE -[phone] IF YOU HAVE:     Chills or fever above 101 F.    Increased redness, warmth, or drainage at your incisions.    Significant bleeding.    Pain not relieved by your pain medication or rest.    Increasing pain after the first 48 hours.    Any other concerns or questions.      **If you have questions or concerns about your procedure,  call Dr. Rush at [phone]**    **If you have questions or concerns about your procedure,  call Dr. Chamorro at [phone]**    Pending Results     No orders found from 8/26/2018 to 8/29/2018.            Admission Information     Date & Time Provider Department Dept. Phone    8/28/2018 Andrew Rush MD [facility] PACU [phone]      Your Vitals Were     Blood Pressure Temperature Respirations Height Weight Pulse Oximetry    141/77 97.3  F (36.3  C) 18 1.854 m (6' 1\") 81.1 kg (178 lb 11.2 oz) 100%    BMI (Body Mass Index)                   23.58 kg/m2           Care EveryWhere ID     This is your Care EveryWhere ID. This could be used by other organizations to access your [facility] medical records  [id number]        Equal Access to Services     GRACIE SALAMANCA AH: Hadii zaheer nunes hadramono Sodavidali, waaxda luqadaha, qaybta kaalmada adeegyada, mary romero. So [facility] [phone].    ATENCIÓN: Si habla español, tiene a reich disposición servicios gratuitos de asistencia lingüística. Llame al [phone].    We comply with applicable federal civil rights laws and [place] laws. We do not discriminate on the basis of race, color, national origin, age, disability, sex, sexual orientation, or gender identity.               Review of your medicines      START taking        Dose / Directions    oxyCODONE-acetaminophen 5-325 MG per tablet   Commonly known as:  PERCOCET   Used for:  S/P hernia repair        Dose:  1-2 tablet   Take 1-2 tablets by mouth every 4 hours as needed for pain (moderate to severe)   Quantity:  " 30 tablet   Refills:  0       senna-docusate 8.6-50 MG per tablet   Commonly known as:  SENOKOT-S;PERICOLACE   Used for:  S/P hernia repair        Dose:  1-2 tablet   Take 1-2 tablets by mouth 2 times daily as needed for constipation   Quantity:  60 tablet   Refills:  1         CONTINUE these medicines which have NOT CHANGED        Dose / Directions    MULTI-VITAMINS Tabs        Dose:  1 tablet   Take 1 tablet by mouth daily   Refills:  0            Where to get your medicines      These medications were sent to [facility] [place], [place] - [address]  [address], [place] [address]     Phone:  [phone]     senna-docusate 8.6-50 MG per tablet         Some of these will need a paper prescription and others can be bought over the counter. Ask your nurse if you have questions.     Bring a paper prescription for each of these medications     oxyCODONE-acetaminophen 5-325 MG per tablet                Protect others around you: Learn how to safely use, store and throw away your medicines at www.disposemymeds.org.        Information about OPIOIDS     PRESCRIPTION OPIOIDS: WHAT YOU NEED TO KNOW   We gave you an opioid (narcotic) pain medicine. It is important to manage your pain, but opioids are not always the best choice. You should first try all the other options your care team gave you. Take this medicine for as short a time (and as few doses) as possible.    Some activities can increase your pain, such as bandage changes or therapy sessions. It may help to take your pain medicine 30 to 60 minutes before these activities. Reduce your stress by getting enough sleep, working on hobbies you enjoy and practicing relaxation or meditation. Talk to your care team about ways to manage your pain beyond prescription opioids.    These medicines have risks:    DO NOT drive when on new or higher doses of pain medicine. These medicines can affect your alertness and reaction times, and you could be  arrested for driving under the influence (DUI). If you need to use opioids long-term, talk to your care team about driving.    DO NOT operate heavy machinery    DO NOT do any other dangerous activities while taking these medicines.    DO NOT drink any alcohol while taking these medicines.     If the opioid prescribed includes acetaminophen, DO NOT take with any other medicines that contain acetaminophen. Read all labels carefully. Look for the word  acetaminophen  or  Tylenol.  Ask your pharmacist if you have questions or are unsure.    You can get addicted to pain medicines, especially if you have a history of addiction (chemical, alcohol or substance dependence). Talk to your care team about ways to reduce this risk.    All opioids tend to cause constipation. Drink plenty of water and eat foods that have a lot of fiber, such as fruits, vegetables, prune juice, apple juice and high-fiber cereal. Take a laxative (Miralax, milk of magnesia, Colace, Senna) if you don t move your bowels at least every other day. Other side effects include upset stomach, sleepiness, dizziness, throwing up, tolerance (needing more of the medicine to have the same effect), physical dependence and slowed breathing.    Store your pills in a secure place, locked if possible. We will not replace any lost or stolen medicine. If you don t finish your medicine, please throw away (dispose) as directed by your pharmacist. The [place] Pollution [employer] has more information about safe disposal: https://www.pca.[facility].[place].us/living-green/managing-unwanted-medications             Medication List: This is a list of all your medications and when to take them. Check marks below indicate your daily home schedule. Keep this list as a reference.      Medications           Morning Afternoon Evening Bedtime As Needed    MULTI-VITAMINS Tabs   Take 1 tablet by mouth daily                                oxyCODONE-acetaminophen 5-325 MG per tablet    Commonly known as:  PERCOCET   Take 1-2 tablets by mouth every 4 hours as needed for pain (moderate to severe)   Last time this was given:  1 tablet on 8/28/2018  2:50 PM                                senna-docusate 8.6-50 MG per tablet   Commonly known as:  SENOKOT-S;PERICOLACE   Take 1-2 tablets by mouth 2 times daily as needed for constipation

## 2018-08-30 NOTE — PROGRESS NOTES
Patient presents to discuss upcoming left inguinal hernia repair with mesh.  He had multiple questions regarding the procedure including the mesh and overall technique.  We had a lengthy discussion regarding his surgical repair.  We discussed the procedure steps in detail.  Also reviewed the prosthesis being used.  This was all done with an anatomic model.  His questions were all answered to his satisfaction.  We are to proceed with surgery following day.  Total time spent was 30 minutes with greater than 50% in face-to-face consultation.

## 2018-09-12 ENCOUNTER — TELEPHONE (OUTPATIENT)
Dept: SURGERY | Facility: CLINIC | Age: 28
End: 2018-09-12

## 2018-09-12 NOTE — TELEPHONE ENCOUNTER
[facility]  Post op call note - Open Inguinal hernia repair  September 12, 2018      Wally Jalloh was called for an update regarding his recovery.  He underwent an open inguinal /sports hernia repair by Dr. Rush 15 days ago.  Today he tells me he is doing well and denies any complaints.  He currently does not need any pain medications.  He is eating a normal diet and his bowels are regular.   The patient states he is slowly resuming normal activity, he also underwent hip surgery with Dr. Chamorro and has started PT for that. He states his wounds are healing well and the steri strips are still in place.  He denies any erythema or drainage at his wounds, but has noticed a slight ridge under his incisions. We discussed that this is normal and will slowly flatten out over time. We reviewed signs of infection to watch for. The patient denies fever/chills, n/v/d, abdominal pain, changes in urination or BM, or wound concerns.     He was instructed to remove steri strips and continue to keep wounds clean.  He was advised to advance his activity per the sports hernia activity advancement guidelines provided and Dr. Chamorro's PT plan. He will return to see Dr. Rush in 2 weeks for follow-up.  The patient states all of his questions were answered and he understands our discussion.  He agrees to follow up in 2 weeks and to call our office with any concerns.      Karen Mark PA-C  [facility]  [phone]        Please route or send letter to:  Primary Care Provider (PCP)

## 2018-09-24 ENCOUNTER — OFFICE VISIT (OUTPATIENT)
Dept: SURGERY | Facility: CLINIC | Age: 28
End: 2018-09-24
Payer: COMMERCIAL

## 2018-09-24 DIAGNOSIS — Z09 SURGERY FOLLOW-UP EXAMINATION: Primary | ICD-10-CM

## 2018-09-24 PROCEDURE — 99024 POSTOP FOLLOW-UP VISIT: CPT | Performed by: SURGERY

## 2018-09-24 NOTE — LETTER
2018    RE: Wally Shubham, : 1990      Patient returns in follow-up after recent left inguinal hernia repair as well as tendon lengthening surgery.  He reports some degree of ongoing mild discomfort.  He has however participating in physical therapy and rehab exercises.  He feels that he is improving.     On examination: His incision is clean and intact.  There is no evidence of infection or hernia recurrence.     Encouraged ongoing core strengthening and flexibility training.  Like to see him back in approximately 6-8 weeks.      Andrew Rush MD

## 2018-09-24 NOTE — MR AVS SNAPSHOT
After Visit Summary   9/24/2018    Wally Jalloh    MRN: [id number]           Patient Information     Date Of Birth          1990        Visit Information        Provider Department      9/24/2018 3:30 PM Andrew Rush MD [facility] [place] [facility] General Surgery      Today's Diagnoses     Surgery follow-up examination    -  1       Follow-ups after your visit        Who to contact     If you have questions or need follow up information about today's clinic visit or your schedule please contact [facility] [place] directly at [phone].  Normal or non-critical lab and imaging results will be communicated to you by MyChart, letter or phone within 4 business days after the clinic has received the results. If you do not hear from us within 7 days, please contact the clinic through MyChart or phone. If you have a critical or abnormal lab result, we will notify you by phone as soon as possible.  Submit refill requests through [employer] or call your pharmacy and they will forward the refill request to us. Please allow 3 business days for your refill to be completed.          Additional Information About Your Visit        Care EveryWhere ID     This is your Care EveryWhere ID. This could be used by other organizations to access your [facility] medical records  [id number]         Blood Pressure from Last 3 Encounters:   08/28/18 139/83   08/23/18 122/72   07/18/18 133/86    Weight from Last 3 Encounters:   08/28/18 178 lb 11.2 oz (81.1 kg)   08/23/18 185 lb (83.9 kg)   07/18/18 182 lb (82.6 kg)              Today, you had the following     No orders found for display       Primary Care Provider Office Phone # Fax #    Angy Tk Hua [phone] [address] [facility] [address]  [place] [address]        Equal Access to Services     GRACIE SALAMANCA AH: Diane Moody, yanely altamirano, mary snyder  rosemary alvareznamelva martinez'aamadyson ah. So [facility] [phone].    ATENCIÓN: Si alexandrela roberta, tiene a reich disposición servicios gratuitos de asistencia lingüística. John al [phone].    We comply with applicable federal civil rights laws and [place] laws. We do not discriminate on the basis of race, color, national origin, age, disability, sex, sexual orientation, or gender identity.            Thank you!     Thank you for choosing [facility]  for your care. Our goal is always to provide you with excellent care. Hearing back from our patients is one way we can continue to improve our services. Please take a few minutes to complete the written survey that you may receive in the mail after your visit with us. Thank you!             Your Updated Medication List - Protect others around you: Learn how to safely use, store and throw away your medicines at www.disposemymeds.org.          This list is accurate as of 9/24/18  3:35 PM.  Always use your most recent med list.                   Brand Name Dispense Instructions for use Diagnosis    MULTI-VITAMINS Tabs      Take 1 tablet by mouth daily        oxyCODONE-acetaminophen 5-325 MG per tablet    PERCOCET    30 tablet    Take 1-2 tablets by mouth every 4 hours as needed for pain (moderate to severe)    S/P hernia repair       senna-docusate 8.6-50 MG per tablet    SENOKOT-S;PERICOLACE    60 tablet    Take 1-2 tablets by mouth 2 times daily as needed for constipation    S/P hernia repair

## 2018-09-28 NOTE — PROGRESS NOTES
Patient returns in follow-up after recent left inguinal hernia repair as well as tendon lengthening surgery.  He reports some degree of ongoing mild discomfort.  He has however participating in physical therapy and rehab exercises.  He feels that he is improving.    On examination: His incision is clean and intact.  There is no evidence of infection or hernia recurrence.    Encouraged ongoing core strengthening and flexibility training.  Like to see him back in approximately 6-8 weeks.

## 2018-11-01 ENCOUNTER — OFFICE VISIT (OUTPATIENT)
Dept: SURGERY | Facility: CLINIC | Age: 28
End: 2018-11-01
Payer: COMMERCIAL

## 2018-11-01 VITALS
BODY MASS INDEX: 23.59 KG/M2 | WEIGHT: 178 LBS | HEART RATE: 75 BPM | OXYGEN SATURATION: 98 % | HEIGHT: 73 IN | DIASTOLIC BLOOD PRESSURE: 74 MMHG | SYSTOLIC BLOOD PRESSURE: 116 MMHG

## 2018-11-01 DIAGNOSIS — Z09 FOLLOW-UP EXAMINATION: Primary | ICD-10-CM

## 2018-11-01 PROCEDURE — 99024 POSTOP FOLLOW-UP VISIT: CPT | Performed by: SURGERY

## 2018-11-01 NOTE — PROGRESS NOTES
Patient returns now 2-month follow-up after left inguinal hernia repair as well as left groin tendon lengthening surgery.  He is continuing to perform rehab exercises particularly for the groin surgery.  He reports no real significant ongoing discomforts or difficulties associated with the inguinal hernia repair.  He has occasional mild discomfort but reports overall improvement from his preoperative level of discomfort and dysfunction.  He has returned to running at this time.    On examination: His incision is well-healed.  There is no evidence of recurrent hernia.    At this time I stressed the need for ongoing core strengthening and flexibility training.  Recommended some degree of deep tissue massage as well.  He has no further restrictions from my perspective but is still working on the rehab for the groin surgery.  I would happily see him back at anytime in the future should questions or problems arise.  Overall he seems pleased with his outcome.    Please route or send letter to:  Primary Care Provider (PCP) and Referring Provider

## 2018-11-01 NOTE — MR AVS SNAPSHOT
"              After Visit Summary   11/1/2018    Wally Jalloh    MRN: [id number]           Patient Information     Date Of Birth          1990        Visit Information        Provider Department      11/1/2018 9:00 AM Andrew Rush MD [facility]      Today's Diagnoses     Follow-up examination    -  1       Follow-ups after your visit        Who to contact     If you have questions or need follow up information about today's clinic visit or your schedule please contact [facility] directly at [phone].  Normal or non-critical lab and imaging results will be communicated to you by MyChart, letter or phone within 4 business days after the clinic has received the results. If you do not hear from us within 7 days, please contact the clinic through MyChart or phone. If you have a critical or abnormal lab result, we will notify you by phone as soon as possible.  Submit refill requests through Upp[employer] or call your pharmacy and they will forward the refill request to us. Please allow 3 business days for your refill to be completed.          Additional Information About Your Visit        Care EveryWhere ID     This is your Care EveryWhere ID. This could be used by other organizations to access your [facility] medical records  [id number]        Your Vitals Were     Pulse Height Pulse Oximetry BMI (Body Mass Index)          75 6' 1\" (1.854 m) 98% 23.48 kg/m2         Blood Pressure from Last 3 Encounters:   11/01/18 116/74   08/28/18 139/83   08/23/18 122/72    Weight from Last 3 Encounters:   11/01/18 178 lb (80.7 kg)   08/28/18 178 lb 11.2 oz (81.1 kg)   08/23/18 185 lb (83.9 kg)              Today, you had the following     No orders found for display       Primary Care Provider Office Phone # Fax #    Angy Tk Hua [phone] [address]  [place] [address]        Equal Access to Services  "    GRACIE SALAMANCA : Hadii aad des rachael Moody, waaxda luqadaha, qaybta kaalmada nurysmalicamilla, mary alvareznamelva romero. So [facility] [phone].    ATENCIÓN: Si habla español, tiene a reich disposición servicios gratuitos de asistencia lingüística. Llame al [phone].    We comply with applicable federal civil rights laws and [place] laws. We do not discriminate on the basis of race, color, national origin, age, disability, sex, sexual orientation, or gender identity.            Thank you!     Thank you for choosing [facility]  for your care. Our goal is always to provide you with excellent care. Hearing back from our patients is one way we can continue to improve our services. Please take a few minutes to complete the written survey that you may receive in the mail after your visit with us. Thank you!             Your Updated Medication List - Protect others around you: Learn how to safely use, store and throw away your medicines at www.disposemymeds.org.          This list is accurate as of 11/1/18  9:14 AM.  Always use your most recent med list.                   Brand Name Dispense Instructions for use Diagnosis    MULTI-VITAMINS Tabs      Take 1 tablet by mouth daily

## 2019-01-03 ENCOUNTER — OFFICE VISIT (OUTPATIENT)
Dept: SURGERY | Facility: CLINIC | Age: 29
End: 2019-01-03
Payer: COMMERCIAL

## 2019-01-03 VITALS
BODY MASS INDEX: 24.52 KG/M2 | WEIGHT: 185 LBS | DIASTOLIC BLOOD PRESSURE: 76 MMHG | OXYGEN SATURATION: 97 % | SYSTOLIC BLOOD PRESSURE: 122 MMHG | HEART RATE: 73 BPM | HEIGHT: 73 IN

## 2019-01-03 DIAGNOSIS — K42.9 UMBILICAL HERNIA WITHOUT OBSTRUCTION AND WITHOUT GANGRENE: Primary | ICD-10-CM

## 2019-01-03 PROCEDURE — 99214 OFFICE O/P EST MOD 30 MIN: CPT | Performed by: SURGERY

## 2019-01-03 ASSESSMENT — MIFFLIN-ST. JEOR: SCORE: 1868.03

## 2019-01-03 NOTE — NURSING NOTE
Pain Location: umbilical    Pain type: dull    Bulge: Yes    Bulge reducible: Yes    Symptoms: Abdominal pain    Time Frame of Hernia: 1 week(s) ago    Ruthy Russo RN

## 2019-01-03 NOTE — PROGRESS NOTES
"[facility]  Surgery Consultation    CONSULTATION REQUESTED BY:  No Ref-Primary, Physician None    HPI: This patient is a 28-year-old gentleman known to me from left inguinal hernia repair performed in August 2018.  His final follow-up relative to the procedure was in November of last year.  He states that approximately 1 week ago he was performing routine rehab exercises associated with his prior repair when he felt a pop within his umbilicus.  He noted a lump at that time that was reducible.  He has had no other significant discomfort associated with this.  He states that it continues to bulge and remains easily reducible.  He has had no bowel symptoms.  Reports that his ongoing rehab associated with the prior left inguinal hernia repair is progressing nicely.    PMH:   has a past medical history of Inguinal hernia and Pancreatitis.  PSH:    has a past surgical history that includes hernia repair; GI surgery; Herniorrhaphy inguinal (Left, 8/28/2018); and Tenotomy hip adductor (Left, 8/28/2018).  Social History:   reports that he has been smoking.  he has never used smokeless tobacco. He reports that he drinks alcohol. He reports that he does not use drugs.  Family History:  family history includes Breast Cancer in his maternal grandmother; Family History Negative in his father and mother.  Medications/Allergies: Home medications and allergies reviewed.    ROS:  The 10 point Review of Systems is negative other than noted in the HPI.    Physical Exam:  /76   Pulse 73   Ht 1.854 m (6' 1\")   Wt 83.9 kg (185 lb)   SpO2 97%   BMI 24.41 kg/m    GENERAL: Generally appears well.  Psych: Alert and Oriented.  Normal affect  Eyes: Sclera clear  Respiratory:  Lungs clear to ausculation bilaterally with good air excursion  Cardiovascular:  Regular Rate and Rhythm with no murmurs gallops or rubs, normal peripheral pulses  GI: Abdomen Non Distended Non-Tender  Umbilical hernia palpated.  Hernia easily " reduciable..  Lymphatic/Hematologic/Immune:  No femoral or cervical lymphadenopathy.  Integumentary:  No rashes  Neurological: grossly intact     All new lab and imaging data was reviewed.     Impression and Plan:  Patient is a 28 year old male with umbilical hernia    PLAN: Recommend outpatient open repair at his convenience  I discussed the pathophysiology of hernias and options for repair including laparoscopic VS open.  The risks associated with the procedure including, but not limited to, recurrence, nerve entrapment or injury, persistence of pain, injury to the bowel/bladder, infertility, hematoma, mesh migration, mesh infection, MI, and PE were discussed with the patient. He indicated understanding of the discussion, asked appropriate questions, and provided consent. Signs and symptoms of incarceration were discussed. If these develop in the interim, he promises to call or go straight to the ER. I have provided the patient with an information pamphlet.  Thank you very much for this consult.    Andrew Rush M.D.  [facility]  [phone]    Please route or send letter to:  Primary Care Provider (PCP) and Referring Provider

## 2019-01-04 ENCOUNTER — TELEPHONE (OUTPATIENT)
Dept: SURGERY | Facility: CLINIC | Age: 29
End: 2019-01-04

## 2019-01-04 NOTE — TELEPHONE ENCOUNTER
Type of surgery: umbilical hernia repair with mesh  Location of surgery: [facility] OR  Date and time of surgery: 01/23/19 8:20am  Surgeon: Dr Rush  Pre-Op Appt Date: pt to schedule  Post-Op Appt Date: pt to schedule   Packet sent out: Yes  Pre-cert/Authorization completed:  Not Applicable  Date: 01/03/19      General anesthesia

## 2019-01-17 ENCOUNTER — OFFICE VISIT (OUTPATIENT)
Dept: FAMILY MEDICINE | Facility: CLINIC | Age: 29
End: 2019-01-17
Payer: COMMERCIAL

## 2019-01-17 VITALS
TEMPERATURE: 97.8 F | HEIGHT: 73 IN | WEIGHT: 182 LBS | SYSTOLIC BLOOD PRESSURE: 134 MMHG | BODY MASS INDEX: 24.12 KG/M2 | DIASTOLIC BLOOD PRESSURE: 79 MMHG | HEART RATE: 77 BPM | OXYGEN SATURATION: 99 %

## 2019-01-17 DIAGNOSIS — Z01.818 PREOP GENERAL PHYSICAL EXAM: Primary | ICD-10-CM

## 2019-01-17 DIAGNOSIS — K42.9 UMBILICAL HERNIA WITHOUT OBSTRUCTION AND WITHOUT GANGRENE: ICD-10-CM

## 2019-01-17 PROCEDURE — 99213 OFFICE O/P EST LOW 20 MIN: CPT | Performed by: NURSE PRACTITIONER

## 2019-01-17 ASSESSMENT — MIFFLIN-ST. JEOR: SCORE: 1854.43

## 2019-01-17 NOTE — PROGRESS NOTES
[place] [address] [facility] [address]  [phone]  Dept: [phone]    PRE-OP EVALUATION:  Today's date: 2019    Wally Jalloh (: 1990) presents for pre-operative evaluation assessment as requested by Dr. Rush.  He requires evaluation and anesthesia risk assessment prior to undergoing surgery/procedure for treatment of umbilical hernia .    Proposed Surgery/ Procedure: HERNIORRHAPHY UMBILICAL  Date of Surgery/ Procedure: 19  Time of Surgery/ Procedure: 8:20 a.m.  Hospital/Surgical Facility:  OR  Fax number for surgical facility:   Primary Physician: No Ref-Primary, Physician  Type of Anesthesia Anticipated: General    Patient has a Health Care Directive or Living Will:  NO    1. NO - Do you have a history of heart attack, stroke, stent, bypass or surgery on an artery in the head, neck, heart or legs?  2. NO - Do you ever have any pain or discomfort in your chest?  3. NO - Do you have a history of  Heart Failure?  4. NO - Are you troubled by shortness of breath when: walking on the level, up a slight hill or at night?  5. NO - Do you currently have a cold, bronchitis or other respiratory infection?  6. NO - Do you have a cough, shortness of breath or wheezing?  7. NO - Do you sometimes get pains in the calves of your legs when you walk?  8. NO - Do you or anyone in your family have previous history of blood clots?  9. NO - Do you or does anyone in your family have a serious bleeding problem such as prolonged bleeding following surgeries or cuts?  10. NO - Have you ever had problems with anemia or been told to take iron pills?  11. NO - Have you had any abnormal blood loss such as black, tarry or bloody stools, or abnormal vaginal bleeding?  12. NO - Have you ever had a blood transfusion?  13. NO - Have you or any of your relatives ever had problems with anesthesia?  14. NO - Do you have sleep apnea, excessive snoring or daytime drowsiness?  15. NO - Do you  have any prosthetic heart valves?  16. NO - Do you have prosthetic joints?  17. NO - Is there any chance that you may be pregnant?      HPI:     HPI related to upcoming procedure: new umbilical hernia that occurred with exercising. Had inguinal hernia repair about 5 months ago. He is feeling well today with no complaints. He is usually quite active.       See problem list for active medical problems.  Problems all longstanding and stable, except as noted/documented.  See ROS for pertinent symptoms related to these conditions.                                                                                                                                                          .    MEDICAL HISTORY:     Patient Active Problem List    Diagnosis Date Noted     Genital warts 10/16/2015     Priority: Medium      Past Medical History:   Diagnosis Date     Inguinal hernia      Pancreatitis      Past Surgical History:   Procedure Laterality Date     GI SURGERY      ERCP X2     HERNIA REPAIR      right inguinal     HERNIORRHAPHY INGUINAL Left 8/28/2018    Procedure: HERNIORRHAPHY INGUINAL;  LEFT INGUINAL HERNIA REPAIR WITH MESH AND  LEFT ADDUCTOR TENDON LENGTHENING;  Surgeon: Andrew Rush MD;  Location:  OR     TENOTOMY HIP ADDUCTOR Left 8/28/2018    Procedure: TENOTOMY HIP ADDUCTOR;;  Surgeon: Reji Chamorro MD;  Location:  OR     Current Outpatient Medications   Medication Sig Dispense Refill     Multiple Vitamin (MULTI-VITAMINS) TABS Take 1 tablet by mouth daily       OTC products: None, except as noted above    Allergies   Allergen Reactions     No Known Allergies       Latex Allergy: NO    Social History     Tobacco Use     Smoking status: Light Tobacco Smoker     Smokeless tobacco: Never Used     Tobacco comment: Occ Cigar    Substance Use Topics     Alcohol use: Yes     Alcohol/week: 0.0 oz     History   Drug Use No       REVIEW OF SYSTEMS:   Constitutional, neuro, ENT, endocrine, pulmonary, cardiac,  "gastrointestinal, genitourinary, musculoskeletal, integument and psychiatric systems are negative, except as otherwise noted.    EXAM:   /79   Pulse 77   Temp 97.8  F (36.6  C) (Oral)   Ht 1.854 m (6' 1\")   Wt 82.6 kg (182 lb)   SpO2 99%   BMI 24.01 kg/m      GENERAL APPEARANCE: healthy, alert and no distress     EYES: EOMI,  PERRL     HENT:  mouth without ulcers or lesions     NECK: no adenopathy, no asymmetry, masses, or scars and thyroid normal to palpation     RESP: lungs clear to auscultation - no rales, rhonchi or wheezes     CV: regular rates and rhythm, normal S1 S2, no S3 or S4 and no murmur, click or rub     MS: extremities normal- no gross deformities noted, no evidence of inflammation in joints, FROM in all extremities.     SKIN: no suspicious lesions or rashes     NEURO: Normal strength and tone, sensory exam grossly normal, mentation intact and speech normal     PSYCH: mentation appears normal. and affect normal/bright     LYMPHATICS: No cervical adenopathy    DIAGNOSTICS:   No labs or EKG required for low risk surgery (cataract, skin procedure, breast biopsy, etc)    Recent Labs   Lab Test 08/23/18  0858   HGB 14.8           IMPRESSION:   Reason for surgery/procedure: umbilical hernia  Diagnosis/reason for consult: medical preop    The proposed surgical procedure is considered INTERMEDIATE risk.    REVISED CARDIAC RISK INDEX  The patient has the following serious cardiovascular risks for perioperative complications such as (MI, PE, VFib and 3  AV Block):  No serious cardiac risks  INTERPRETATION: 0 risks: Class I (very low risk - 0.4% complication rate)    The patient has the following additional risks for perioperative complications:  No identified additional risks      ICD-10-CM    1. Preop general physical exam Z01.818    2. Umbilical hernia without obstruction and without gangrene K42.9        RECOMMENDATIONS:       --Patient is on no chronic medications  Hold multivit morning " of surgery      APPROVAL GIVEN to proceed with proposed procedure, without further diagnostic evaluation       Signed Electronically by: UNRULY Fritz CNP    Copy of this evaluation report is provided to requesting physician.    [facility] Preop Guidelines    Revised Cardiac Risk Index

## 2019-01-17 NOTE — H&P (VIEW-ONLY)
[place] [address] [address] [facility] [address]  [phone]  Dept: [phone]    PRE-OP EVALUATION:  Today's date: 2019    Wally Jalloh (: 1990) presents for pre-operative evaluation assessment as requested by Dr. Rush.  He requires evaluation and anesthesia risk assessment prior to undergoing surgery/procedure for treatment of umbilical hernia .    Proposed Surgery/ Procedure: HERNIORRHAPHY UMBILICAL  Date of Surgery/ Procedure: 19  Time of Surgery/ Procedure: 8:20 a.m.  Hospital/Surgical Facility:  OR  Fax number for surgical facility:   Primary Physician: No Ref-Primary, Physician  Type of Anesthesia Anticipated: General    Patient has a Health Care Directive or Living Will:  NO    1. NO - Do you have a history of heart attack, stroke, stent, bypass or surgery on an artery in the head, neck, heart or legs?  2. NO - Do you ever have any pain or discomfort in your chest?  3. NO - Do you have a history of  Heart Failure?  4. NO - Are you troubled by shortness of breath when: walking on the level, up a slight hill or at night?  5. NO - Do you currently have a cold, bronchitis or other respiratory infection?  6. NO - Do you have a cough, shortness of breath or wheezing?  7. NO - Do you sometimes get pains in the calves of your legs when you walk?  8. NO - Do you or anyone in your family have previous history of blood clots?  9. NO - Do you or does anyone in your family have a serious bleeding problem such as prolonged bleeding following surgeries or cuts?  10. NO - Have you ever had problems with anemia or been told to take iron pills?  11. NO - Have you had any abnormal blood loss such as black, tarry or bloody stools, or abnormal vaginal bleeding?  12. NO - Have you ever had a blood transfusion?  13. NO - Have you or any of your relatives ever had problems with anesthesia?  14. NO - Do you have sleep apnea, excessive snoring or daytime drowsiness?  15. NO - Do you  have any prosthetic heart valves?  16. NO - Do you have prosthetic joints?  17. NO - Is there any chance that you may be pregnant?      HPI:     HPI related to upcoming procedure: new umbilical hernia that occurred with exercising. Had inguinal hernia repair about 5 months ago. He is feeling well today with no complaints. He is usually quite active.       See problem list for active medical problems.  Problems all longstanding and stable, except as noted/documented.  See ROS for pertinent symptoms related to these conditions.                                                                                                                                                          .    MEDICAL HISTORY:     Patient Active Problem List    Diagnosis Date Noted     Genital warts 10/16/2015     Priority: Medium      Past Medical History:   Diagnosis Date     Inguinal hernia      Pancreatitis      Past Surgical History:   Procedure Laterality Date     GI SURGERY      ERCP X2     HERNIA REPAIR      right inguinal     HERNIORRHAPHY INGUINAL Left 8/28/2018    Procedure: HERNIORRHAPHY INGUINAL;  LEFT INGUINAL HERNIA REPAIR WITH MESH AND  LEFT ADDUCTOR TENDON LENGTHENING;  Surgeon: Andrew Rush MD;  Location:  OR     TENOTOMY HIP ADDUCTOR Left 8/28/2018    Procedure: TENOTOMY HIP ADDUCTOR;;  Surgeon: Reji Chamorro MD;  Location:  OR     Current Outpatient Medications   Medication Sig Dispense Refill     Multiple Vitamin (MULTI-VITAMINS) TABS Take 1 tablet by mouth daily       OTC products: None, except as noted above    Allergies   Allergen Reactions     No Known Allergies       Latex Allergy: NO    Social History     Tobacco Use     Smoking status: Light Tobacco Smoker     Smokeless tobacco: Never Used     Tobacco comment: Occ Cigar    Substance Use Topics     Alcohol use: Yes     Alcohol/week: 0.0 oz     History   Drug Use No       REVIEW OF SYSTEMS:   Constitutional, neuro, ENT, endocrine, pulmonary, cardiac,  "gastrointestinal, genitourinary, musculoskeletal, integument and psychiatric systems are negative, except as otherwise noted.    EXAM:   /79   Pulse 77   Temp 97.8  F (36.6  C) (Oral)   Ht 1.854 m (6' 1\")   Wt 82.6 kg (182 lb)   SpO2 99%   BMI 24.01 kg/m      GENERAL APPEARANCE: healthy, alert and no distress     EYES: EOMI,  PERRL     HENT:  mouth without ulcers or lesions     NECK: no adenopathy, no asymmetry, masses, or scars and thyroid normal to palpation     RESP: lungs clear to auscultation - no rales, rhonchi or wheezes     CV: regular rates and rhythm, normal S1 S2, no S3 or S4 and no murmur, click or rub     MS: extremities normal- no gross deformities noted, no evidence of inflammation in joints, FROM in all extremities.     SKIN: no suspicious lesions or rashes     NEURO: Normal strength and tone, sensory exam grossly normal, mentation intact and speech normal     PSYCH: mentation appears normal. and affect normal/bright     LYMPHATICS: No cervical adenopathy    DIAGNOSTICS:   No labs or EKG required for low risk surgery (cataract, skin procedure, breast biopsy, etc)    Recent Labs   Lab Test 08/23/18  0858   HGB 14.8           IMPRESSION:   Reason for surgery/procedure: umbilical hernia  Diagnosis/reason for consult: medical preop    The proposed surgical procedure is considered INTERMEDIATE risk.    REVISED CARDIAC RISK INDEX  The patient has the following serious cardiovascular risks for perioperative complications such as (MI, PE, VFib and 3  AV Block):  No serious cardiac risks  INTERPRETATION: 0 risks: Class I (very low risk - 0.4% complication rate)    The patient has the following additional risks for perioperative complications:  No identified additional risks      ICD-10-CM    1. Preop general physical exam Z01.818    2. Umbilical hernia without obstruction and without gangrene K42.9        RECOMMENDATIONS:       --Patient is on no chronic medications  Hold multivit morning " of surgery      APPROVAL GIVEN to proceed with proposed procedure, without further diagnostic evaluation       Signed Electronically by: UNRULY Fritz CNP    Copy of this evaluation report is provided to requesting physician.    [facility] Preop Guidelines    Revised Cardiac Risk Index

## 2019-01-23 ENCOUNTER — ANESTHESIA EVENT (OUTPATIENT)
Dept: SURGERY | Facility: CLINIC | Age: 29
End: 2019-01-23
Payer: COMMERCIAL

## 2019-01-23 ENCOUNTER — OFFICE VISIT (OUTPATIENT)
Dept: SURGERY | Facility: PHYSICIAN GROUP | Age: 29
End: 2019-01-23
Payer: COMMERCIAL

## 2019-01-23 ENCOUNTER — HOSPITAL ENCOUNTER (OUTPATIENT)
Facility: CLINIC | Age: 29
Discharge: HOME | End: 2019-01-23
Attending: SURGERY | Admitting: SURGERY
Payer: COMMERCIAL

## 2019-01-23 ENCOUNTER — ANESTHESIA (OUTPATIENT)
Dept: SURGERY | Facility: CLINIC | Age: 29
End: 2019-01-23
Payer: COMMERCIAL

## 2019-01-23 VITALS
TEMPERATURE: 96.8 F | RESPIRATION RATE: 16 BRPM | HEIGHT: 73 IN | OXYGEN SATURATION: 99 % | BODY MASS INDEX: 23.89 KG/M2 | HEART RATE: 70 BPM | SYSTOLIC BLOOD PRESSURE: 117 MMHG | DIASTOLIC BLOOD PRESSURE: 72 MMHG | WEIGHT: 180.3 LBS

## 2019-01-23 DIAGNOSIS — K42.9 UMBILICAL HERNIA WITHOUT OBSTRUCTION AND WITHOUT GANGRENE: Primary | ICD-10-CM

## 2019-01-23 DIAGNOSIS — G89.18 ACUTE POST-OPERATIVE PAIN: ICD-10-CM

## 2019-01-23 PROCEDURE — 71000013 ZZH RECOVERY PHASE 1 LEVEL 1 EA ADDTL HR: Performed by: SURGERY

## 2019-01-23 PROCEDURE — 40000170 ZZH STATISTIC PRE-PROCEDURE ASSESSMENT II: Performed by: SURGERY

## 2019-01-23 PROCEDURE — 25000128 H RX IP 250 OP 636: Performed by: NURSE ANESTHETIST, CERTIFIED REGISTERED

## 2019-01-23 PROCEDURE — 25000128 H RX IP 250 OP 636: Performed by: ANESTHESIOLOGY

## 2019-01-23 PROCEDURE — 49585 ZZHC REPAIR UMBILICAL HERN,5+Y/O,REDUC: CPT | Performed by: SURGERY

## 2019-01-23 PROCEDURE — C1781 MESH (IMPLANTABLE): HCPCS | Performed by: SURGERY

## 2019-01-23 PROCEDURE — 25000566 ZZH SEVOFLURANE, EA 15 MIN: Performed by: SURGERY

## 2019-01-23 PROCEDURE — 37000009 ZZH ANESTHESIA TECHNICAL FEE, EACH ADDTL 15 MIN: Performed by: SURGERY

## 2019-01-23 PROCEDURE — 25000132 ZZH RX MED GY IP 250 OP 250 PS 637: Performed by: PHYSICIAN ASSISTANT

## 2019-01-23 PROCEDURE — 27210794 ZZH OR GENERAL SUPPLY STERILE: Performed by: SURGERY

## 2019-01-23 PROCEDURE — 25000125 ZZHC RX 250: Performed by: NURSE ANESTHETIST, CERTIFIED REGISTERED

## 2019-01-23 PROCEDURE — 71000012 ZZH RECOVERY PHASE 1 LEVEL 1 FIRST HR: Performed by: SURGERY

## 2019-01-23 PROCEDURE — 71000027 ZZH RECOVERY PHASE 2 EACH 15 MINS: Performed by: SURGERY

## 2019-01-23 PROCEDURE — 25000125 ZZHC RX 250: Performed by: SURGERY

## 2019-01-23 PROCEDURE — 25000128 H RX IP 250 OP 636: Performed by: SURGERY

## 2019-01-23 PROCEDURE — 49585 ZZHC REPAIR UMBILICAL HERN,5+Y/O,REDUC: CPT | Mod: AS | Performed by: PHYSICIAN ASSISTANT

## 2019-01-23 PROCEDURE — 36000050 ZZH SURGERY LEVEL 2 1ST 30 MIN: Performed by: SURGERY

## 2019-01-23 PROCEDURE — 37000008 ZZH ANESTHESIA TECHNICAL FEE, 1ST 30 MIN: Performed by: SURGERY

## 2019-01-23 PROCEDURE — 36000052 ZZH SURGERY LEVEL 2 EA 15 ADDTL MIN: Performed by: SURGERY

## 2019-01-23 DEVICE — MESH COMPOSITE W/COLLAGEN 4CM OPEN VENTRAL PARIETEX PCO4VP: Type: IMPLANTABLE DEVICE | Site: UMBILICAL | Status: FUNCTIONAL

## 2019-01-23 RX ORDER — NALOXONE HYDROCHLORIDE 0.4 MG/ML
.1-.4 INJECTION, SOLUTION INTRAMUSCULAR; INTRAVENOUS; SUBCUTANEOUS
Status: DISCONTINUED | OUTPATIENT
Start: 2019-01-23 | End: 2019-01-23 | Stop reason: HOSPADM

## 2019-01-23 RX ORDER — LIDOCAINE HYDROCHLORIDE 20 MG/ML
INJECTION, SOLUTION INFILTRATION; PERINEURAL PRN
Status: DISCONTINUED | OUTPATIENT
Start: 2019-01-23 | End: 2019-01-23

## 2019-01-23 RX ORDER — SODIUM CHLORIDE, SODIUM LACTATE, POTASSIUM CHLORIDE, CALCIUM CHLORIDE 600; 310; 30; 20 MG/100ML; MG/100ML; MG/100ML; MG/100ML
INJECTION, SOLUTION INTRAVENOUS CONTINUOUS PRN
Status: DISCONTINUED | OUTPATIENT
Start: 2019-01-23 | End: 2019-01-23

## 2019-01-23 RX ORDER — ONDANSETRON 2 MG/ML
4 INJECTION INTRAMUSCULAR; INTRAVENOUS EVERY 30 MIN PRN
Status: DISCONTINUED | OUTPATIENT
Start: 2019-01-23 | End: 2019-01-23 | Stop reason: HOSPADM

## 2019-01-23 RX ORDER — BUPIVACAINE HYDROCHLORIDE AND EPINEPHRINE 5; 5 MG/ML; UG/ML
INJECTION, SOLUTION EPIDURAL; INTRACAUDAL; PERINEURAL PRN
Status: DISCONTINUED | OUTPATIENT
Start: 2019-01-23 | End: 2019-01-23 | Stop reason: HOSPADM

## 2019-01-23 RX ORDER — HYDROMORPHONE HYDROCHLORIDE 1 MG/ML
.3-.5 INJECTION, SOLUTION INTRAMUSCULAR; INTRAVENOUS; SUBCUTANEOUS EVERY 10 MIN PRN
Status: DISCONTINUED | OUTPATIENT
Start: 2019-01-23 | End: 2019-01-23 | Stop reason: HOSPADM

## 2019-01-23 RX ORDER — DEXAMETHASONE SODIUM PHOSPHATE 4 MG/ML
INJECTION, SOLUTION INTRA-ARTICULAR; INTRALESIONAL; INTRAMUSCULAR; INTRAVENOUS; SOFT TISSUE PRN
Status: DISCONTINUED | OUTPATIENT
Start: 2019-01-23 | End: 2019-01-23

## 2019-01-23 RX ORDER — NEOSTIGMINE METHYLSULFATE 1 MG/ML
VIAL (ML) INJECTION PRN
Status: DISCONTINUED | OUTPATIENT
Start: 2019-01-23 | End: 2019-01-23

## 2019-01-23 RX ORDER — ONDANSETRON 2 MG/ML
INJECTION INTRAMUSCULAR; INTRAVENOUS PRN
Status: DISCONTINUED | OUTPATIENT
Start: 2019-01-23 | End: 2019-01-23

## 2019-01-23 RX ORDER — BUPIVACAINE HYDROCHLORIDE AND EPINEPHRINE 5; 5 MG/ML; UG/ML
INJECTION, SOLUTION EPIDURAL; INTRACAUDAL; PERINEURAL
Status: DISCONTINUED
Start: 2019-01-23 | End: 2019-01-23 | Stop reason: HOSPADM

## 2019-01-23 RX ORDER — CEFAZOLIN SODIUM 2 G/100ML
2 INJECTION, SOLUTION INTRAVENOUS
Status: COMPLETED | OUTPATIENT
Start: 2019-01-23 | End: 2019-01-23

## 2019-01-23 RX ORDER — HYDROCODONE BITARTRATE AND ACETAMINOPHEN 5; 325 MG/1; MG/1
1 TABLET ORAL
Status: COMPLETED | OUTPATIENT
Start: 2019-01-23 | End: 2019-01-23

## 2019-01-23 RX ORDER — MEPERIDINE HYDROCHLORIDE 25 MG/ML
12.5 INJECTION INTRAMUSCULAR; INTRAVENOUS; SUBCUTANEOUS
Status: DISCONTINUED | OUTPATIENT
Start: 2019-01-23 | End: 2019-01-23 | Stop reason: HOSPADM

## 2019-01-23 RX ORDER — FENTANYL CITRATE 50 UG/ML
25-50 INJECTION, SOLUTION INTRAMUSCULAR; INTRAVENOUS
Status: DISCONTINUED | OUTPATIENT
Start: 2019-01-23 | End: 2019-01-23 | Stop reason: HOSPADM

## 2019-01-23 RX ORDER — GLYCOPYRROLATE 0.2 MG/ML
INJECTION, SOLUTION INTRAMUSCULAR; INTRAVENOUS PRN
Status: DISCONTINUED | OUTPATIENT
Start: 2019-01-23 | End: 2019-01-23

## 2019-01-23 RX ORDER — CEFAZOLIN SODIUM 1 G/3ML
1 INJECTION, POWDER, FOR SOLUTION INTRAMUSCULAR; INTRAVENOUS SEE ADMIN INSTRUCTIONS
Status: DISCONTINUED | OUTPATIENT
Start: 2019-01-23 | End: 2019-01-23 | Stop reason: HOSPADM

## 2019-01-23 RX ORDER — PROPOFOL 10 MG/ML
INJECTION, EMULSION INTRAVENOUS PRN
Status: DISCONTINUED | OUTPATIENT
Start: 2019-01-23 | End: 2019-01-23

## 2019-01-23 RX ORDER — FENTANYL CITRATE 50 UG/ML
INJECTION, SOLUTION INTRAMUSCULAR; INTRAVENOUS PRN
Status: DISCONTINUED | OUTPATIENT
Start: 2019-01-23 | End: 2019-01-23

## 2019-01-23 RX ORDER — HYDROCODONE BITARTRATE AND ACETAMINOPHEN 5; 325 MG/1; MG/1
1-2 TABLET ORAL EVERY 4 HOURS PRN
Qty: 20 TABLET | Refills: 0 | Status: SHIPPED | OUTPATIENT
Start: 2019-01-23 | End: 2019-01-26

## 2019-01-23 RX ORDER — ONDANSETRON 4 MG/1
4 TABLET, ORALLY DISINTEGRATING ORAL EVERY 30 MIN PRN
Status: DISCONTINUED | OUTPATIENT
Start: 2019-01-23 | End: 2019-01-23 | Stop reason: HOSPADM

## 2019-01-23 RX ORDER — KETOROLAC TROMETHAMINE 30 MG/ML
INJECTION, SOLUTION INTRAMUSCULAR; INTRAVENOUS PRN
Status: DISCONTINUED | OUTPATIENT
Start: 2019-01-23 | End: 2019-01-23

## 2019-01-23 RX ORDER — MAGNESIUM HYDROXIDE 1200 MG/15ML
LIQUID ORAL PRN
Status: DISCONTINUED | OUTPATIENT
Start: 2019-01-23 | End: 2019-01-23 | Stop reason: HOSPADM

## 2019-01-23 RX ORDER — SODIUM CHLORIDE, SODIUM LACTATE, POTASSIUM CHLORIDE, CALCIUM CHLORIDE 600; 310; 30; 20 MG/100ML; MG/100ML; MG/100ML; MG/100ML
INJECTION, SOLUTION INTRAVENOUS CONTINUOUS
Status: DISCONTINUED | OUTPATIENT
Start: 2019-01-23 | End: 2019-01-23 | Stop reason: HOSPADM

## 2019-01-23 RX ADMIN — HYDROCODONE BITARTRATE AND ACETAMINOPHEN 1 TABLET: 5; 325 TABLET ORAL at 09:26

## 2019-01-23 RX ADMIN — LIDOCAINE HYDROCHLORIDE 20 MG: 20 INJECTION, SOLUTION INFILTRATION; PERINEURAL at 08:39

## 2019-01-23 RX ADMIN — CEFAZOLIN SODIUM 2 G: 2 INJECTION, SOLUTION INTRAVENOUS at 08:08

## 2019-01-23 RX ADMIN — FENTANYL CITRATE 50 MCG: 50 INJECTION, SOLUTION INTRAMUSCULAR; INTRAVENOUS at 09:14

## 2019-01-23 RX ADMIN — MIDAZOLAM 2 MG: 1 INJECTION INTRAMUSCULAR; INTRAVENOUS at 07:58

## 2019-01-23 RX ADMIN — ONDANSETRON 4 MG: 2 INJECTION INTRAMUSCULAR; INTRAVENOUS at 08:39

## 2019-01-23 RX ADMIN — SODIUM CHLORIDE, POTASSIUM CHLORIDE, SODIUM LACTATE AND CALCIUM CHLORIDE: 600; 310; 30; 20 INJECTION, SOLUTION INTRAVENOUS at 07:59

## 2019-01-23 RX ADMIN — NEOSTIGMINE METHYLSULFATE 4 MG: 1 INJECTION, SOLUTION INTRAVENOUS at 08:37

## 2019-01-23 RX ADMIN — ONDANSETRON 2 MG: 2 INJECTION INTRAMUSCULAR; INTRAVENOUS at 08:30

## 2019-01-23 RX ADMIN — KETOROLAC TROMETHAMINE 30 MG: 30 INJECTION, SOLUTION INTRAMUSCULAR at 08:33

## 2019-01-23 RX ADMIN — ROCURONIUM BROMIDE 40 MG: 10 INJECTION INTRAVENOUS at 08:03

## 2019-01-23 RX ADMIN — LIDOCAINE HYDROCHLORIDE 80 MG: 20 INJECTION, SOLUTION INFILTRATION; PERINEURAL at 08:03

## 2019-01-23 RX ADMIN — DEXAMETHASONE SODIUM PHOSPHATE 4 MG: 4 INJECTION, SOLUTION INTRA-ARTICULAR; INTRALESIONAL; INTRAMUSCULAR; INTRAVENOUS; SOFT TISSUE at 08:23

## 2019-01-23 RX ADMIN — FENTANYL CITRATE 100 MCG: 50 INJECTION, SOLUTION INTRAMUSCULAR; INTRAVENOUS at 08:16

## 2019-01-23 RX ADMIN — GLYCOPYRROLATE 0.2 MG: 0.2 INJECTION, SOLUTION INTRAMUSCULAR; INTRAVENOUS at 08:27

## 2019-01-23 RX ADMIN — FENTANYL CITRATE 100 MCG: 50 INJECTION, SOLUTION INTRAMUSCULAR; INTRAVENOUS at 08:03

## 2019-01-23 RX ADMIN — DEXMEDETOMIDINE HYDROCHLORIDE 12 MCG: 100 INJECTION, SOLUTION INTRAVENOUS at 08:24

## 2019-01-23 RX ADMIN — PROPOFOL 200 MG: 10 INJECTION, EMULSION INTRAVENOUS at 08:03

## 2019-01-23 RX ADMIN — FENTANYL CITRATE 50 MCG: 50 INJECTION, SOLUTION INTRAMUSCULAR; INTRAVENOUS at 09:09

## 2019-01-23 RX ADMIN — GLYCOPYRROLATE 0.8 MG: 0.2 INJECTION, SOLUTION INTRAMUSCULAR; INTRAVENOUS at 08:37

## 2019-01-23 ASSESSMENT — ENCOUNTER SYMPTOMS: SEIZURES: 0

## 2019-01-23 ASSESSMENT — MIFFLIN-ST. JEOR: SCORE: 1846.72

## 2019-01-23 ASSESSMENT — LIFESTYLE VARIABLES: TOBACCO_USE: 1

## 2019-01-23 NOTE — ANESTHESIA CARE TRANSFER NOTE
Patient: Wally Jalloh    Procedure(s):  UMBILICAL HERNIA REPAIR WITH MESH    Diagnosis: UMBILICAL HERNIA  Diagnosis Additional Information: No value filed.    Anesthesia Type:   General, LMA     Note:  Airway :Face Mask  Patient transferred to:PACU  Comments: Neuromuscular blockade reversed after TOF 4/4, spontaneous respirations, adequate tidal volumes, followed commands to voice, oropharynx suctioned with soft flexible catheter, extubated atraumatically, extubated with suction, airway patent after extubation.  Oxygen via facemask at 6 liters per minute to PACU. Oxygen tubing connected to wall O2 in PACU, SpO2, NiBP, and EKG monitors and alarms on and functioning, Joel Hugger warmer connected to patient gown, report on patient's clinical status given to PACU RN, RN questions answered. Handoff Report: Identifed the Patient, Identified the Reponsible Provider, Reviewed the pertinent medical history, Discussed the surgical course, Reviewed Intra-OP anesthesia mangement and issues during anesthesia, Set expectations for post-procedure period and Allowed opportunity for questions and acknowledgement of understanding      Vitals: (Last set prior to Anesthesia Care Transfer)    CRNA VITALS  1/23/2019 0820 - 1/23/2019 0856      1/23/2019             Pulse:  100    SpO2:  98 %    Resp Rate (observed):  5  (Abnormal)         138/72-81-12-98%-97.4F  Pt alert and states he is comfortable        Electronically Signed By: Isabella Carlson  January 23, 2019  8:56 AM

## 2019-01-23 NOTE — DISCHARGE INSTRUCTIONS
Same Day Surgery Discharge Instructions for  Sedation and General Anesthesia       It's not unusual to feel dizzy, light-headed or faint for up to 24 hours after surgery or while taking pain medication.  If you have these symptoms: sit for a few minutes before standing and have someone assist you when you get up to walk or use the bathroom.      You should rest and relax for the next 24 hours. We recommend you make arrangements to have an adult stay with you for at least 24 hours after your discharge.  Avoid hazardous and strenuous activity.      DO NOT DRIVE any vehicle or operate mechanical equipment for 24 hours following the end of your surgery.  Even though you may feel normal, your reactions may be affected by the medication you have received.      Do not drink alcoholic beverages for 24 hours following surgery.       Slowly progress to your regular diet as you feel able. It's not unusual to feel nauseated and/or vomit after receiving anesthesia.  If you develop these symptoms, drink clear liquids (apple juice, ginger ale, broth, 7-up, etc. ) until you feel better.  If your nausea and vomiting persists for 24 hours, please notify your surgeon.        All narcotic pain medications, along with inactivity and anesthesia, can cause constipation. Drinking plenty of liquids and increasing fiber intake will help.      For any questions of a medical nature, call your surgeon.      Do not make important decisions for 24 hours.      If you had general anesthesia, you may have a sore throat for a couple of days related to the breathing tube used during surgery.  You may use Cepacol lozenges to help with this discomfort.  If it worsens or if you develop a fever, contact your surgeon.       If you feel your pain is not well managed with the pain medications prescribed by your surgeon, please contact your surgeon's office to let them know so they can address your concerns.       Today you received Toradol, an  antiinflammatory medication similar to Ibuprofen.  You should not take other antiinflammatory medication, such as Ibuprofen, Motrin, Advil, Aleve, Naprosyn, etc until 2:30 p.m.     [facility]  Discharge Instructions: Post-Operative Umbilical or Ventral Hernia    ACTIVITY    Take frequent, short walks and increase your activity gradually.    Avoid strenuous physical activity or heavy lifting greater than 15lbs. for 3-4 weeks.  You may climb stairs.    You may drive without restrictions when you are not using any prescription pain medication and comfortable in a car.    You may return to work/school when you are comfortable without any prescription pain medication.    WOUND CARE    You may remove your bandage and shower 48 hours after the surgery.  Pat your incisions dry and leave open to air.  Re-apply dressing (Band-aid or gauze/tape) as needed for drainage.    You may have steri-strips (looks like tape) on your incision.  You may peel off the steri-strips 2 weeks after your surgery if they have not peeled off on their own.    Do not soak your incisions in a tub or pool for 2 weeks.     Do not apply any lotions, creams, or ointments to your incision.    A ridge under the incision is normal and will gradually resolve.    DIET    Start with liquids, then gradually resume your regular diet as tolerated.    Drink plenty of fluids to stay hydrated.    PAIN    Expect some tenderness and discomfort at the incision site(s).  Use the prescribed pain medication at your discretion.  Expect gradual resolution of your pain over several days.    You may take ibuprofen with food (unless you have been told not to) instead of or in addition to your prescribed pain medication.  If you are taking Norco or Percocet, do not take any additional acetaminophen/APAP/Tylenol.      Do not drink alcohol or drive while you are taking pain medications.    You may apply ice to your incisions in 20 minute  intervals as needed for the next 48 hours.  After that time, consider switching to heat if you prefer.    EXPECTATIONS    Pain medications can cause constipation.  Limit use when possible.  Take over the counter stool softener/stimulant, such as Colace or Senna, 1-2 times a day with plenty of water.  You may take a mild over the counter laxative, such as Miralax or a supository as needed.        You may discontinue these medications once you are having regular bowel movements and/or are no longer taking our narcotic pain medication.      RETURN APPOINTMENT    Our office will contact you approximately 2 weeks to check on your progress and answer questions you may have.  If you are doing well, you will not need to return for a follow up appointment.  If any concerns are identified over the phone, we will help you make an appointment to see a provider.    If you have not received a phone call, have any questions or concerns, or would like to be seen, please call us at [phone] and ask to speak with our nurse.  We are located at [address] [place] [address].      CALL OUR OFFICE IF YOU HAVE:     Chills or fever above 101 F.    Increased redness, warmth or drainage at your incisions.    Significant bleeding.    Pain not relieved by your pain medication or rest.    Increasing pain after the first 48 hours.    Any other concerns or questions.    Revised January 2018  **If you have questions or concerns about your procedure,  call Dr. Rush at [phone]**

## 2019-01-23 NOTE — BRIEF OP NOTE
[facility]    Brief Operative Note    Pre-operative diagnosis: UMBILICAL HERNIA  Post-operative diagnosis Umbilical Hernia  Procedure: Procedure(s):  UMBILICAL HERNIA REPAIR WITH MESH  Surgeon: Surgeon(s) and Role:     * Andrew Rush MD - Primary  Anesthesia: General   Estimated blood loss: 5 mL  Drains: None  Specimens: * No specimens in log *  Findings:   None.  Complications: None.  Implants: Parietex mesh.  See op note for size  See Operative Report for full details.  No complications noted.

## 2019-01-23 NOTE — OP NOTE
General Surgery Operative Note    PREOPERATIVE DIAGNOSIS:  UMBILICAL HERNIA    POSTOPERATIVE DIAGNOSIS:  Umbilical Hernia    PROCEDURE:  UMBILICAL HERNIA REPAIR WITH MESH    ANESTHESIA:  General.    PREOPERATIVE MEDICATIONS:  Ancef iv    SURGEON:  Andrew Rush M.D.    ASSISTANT:  NONE    INDICATIONS:  Symptomatic umbilical Hernia. Options thoroughly discussed in the office. Risks and alternatives reviewed. To proceed with open hernia repair with mesh.    DESCRIPTION OF PROCEDURE: The patient was taken to the operating suite and placed under general anesthetic. The abdomen was prepped and draped in a sterile fashion. Prophylactic antibiotics were administered.  Surgeon initiated timeout was acknowledged. We made a curvilinear incision just below the umbilicus.  This incision was carried down through the subcutaneous tissues.  The umbilical skin was mobilized from the underlying hernia sac.  The hernia sac was dissected down to the level of the fascial margins. The margins of the fascia were thoroughly dissected and the hernia sac and its contents were reduced. Additional preperitoneal dissection was performed around the margins of the defect. PCO 4VP was then introduced. This was deployed through the defect. This laid out flat in the preperitoneal space. The tails of the mesh were cut and the fascia was closed overlying the mesh, incorporating mesh fixation to the cut tails using interrupted 0 Vicryl suture.  Umbilical plasty was then performed suturing the umbilical skin back to the closed fascia with a 3-0 Vicryl suture.  Deep subcu was closed with 3-0 Vicryl stitch. Skin incision was closed with subcuticular 4-0 Vicryl stitch. Benzoin and Steri-Strips were applied. Needle and sponge counts were correct. The patient tolerated this   well and was taken from the operating room to the recovery room in stable condition.  condition.       ESTIMATED BLOOD LOSS: 2cc        Andrew Rush MD

## 2019-01-23 NOTE — ANESTHESIA POSTPROCEDURE EVALUATION
Patient: Wally Jalloh    Procedure(s):  UMBILICAL HERNIA REPAIR WITH MESH    Diagnosis:UMBILICAL HERNIA  Diagnosis Additional Information: No value filed.    Anesthesia Type:  General, LMA    Note:  Anesthesia Post Evaluation    Patient location during evaluation: PACU  Patient participation: Able to fully participate in evaluation  Level of consciousness: awake and alert  Pain management: satisfactory to patient  Airway patency: patent  Cardiovascular status: hemodynamically stable  Respiratory status: acceptable and unassisted  Hydration status: balanced  PONV: none     Anesthetic complications: None          Last vitals:  Vitals:    01/23/19 0930 01/23/19 0945 01/23/19 1015   BP: 121/74 115/77 117/72   Pulse: 56 70    Resp: 14 12 16   Temp: 35.9  C (96.6  F) 36  C (96.8  F)    SpO2: 97% 95% 99%         Electronically Signed By: Darinel Jackson MD  January 23, 2019  12:21 PM

## 2019-01-23 NOTE — ANESTHESIA PREPROCEDURE EVALUATION
Anesthesia Pre-Procedure Evaluation    Patient: Wally Jalloh   MRN: [id number] : 1990          Preoperative Diagnosis: UMBILICAL HERNIA    Procedure(s):  HERNIORRHAPHY UMBILICAL    Past Medical History:   Diagnosis Date     Inguinal hernia      Pancreatitis      Past Surgical History:   Procedure Laterality Date     GI SURGERY      ERCP X2     HERNIA REPAIR      right inguinal     HERNIORRHAPHY INGUINAL Left 2018    Procedure: HERNIORRHAPHY INGUINAL;  LEFT INGUINAL HERNIA REPAIR WITH MESH AND  LEFT ADDUCTOR TENDON LENGTHENING;  Surgeon: Andrew Rush MD;  Location:  OR     TENOTOMY HIP ADDUCTOR Left 2018    Procedure: TENOTOMY HIP ADDUCTOR;;  Surgeon: Reji Chamorro MD;  Location:  OR       Anesthesia Evaluation     . Pt has had prior anesthetic.     No history of anesthetic complications          ROS/MED HX    ENT/Pulmonary:     (+)tobacco use, , . .   (-) sleep apnea   Neurologic:      (-) seizures   Cardiovascular:        (-) dyslipidemia   METS/Exercise Tolerance:     Hematologic:         Musculoskeletal:         GI/Hepatic: Comment: Hx pancreatitis        (-) GERD and liver disease   Renal/Genitourinary:      (-) renal disease   Endo:      (-) Type II DM and thyroid disease   Psychiatric:         Infectious Disease:         Malignancy:         Other:                          Physical Exam  Normal systems: cardiovascular, pulmonary and dental    Airway   Mallampati: I  TM distance: >3 FB  Neck ROM: full    Dental     Cardiovascular       Pulmonary             Lab Results   Component Value Date    HGB 14.8 2018     2018       Preop Vitals  BP Readings from Last 3 Encounters:   19 126/82   19 134/79   19 122/76    Pulse Readings from Last 3 Encounters:   19 77   19 73   18 75      Resp Readings from Last 3 Encounters:   19 16   18 18   10/14/15 16    SpO2 Readings from Last 3 Encounters:   19 97%  "  01/17/19 99%   01/03/19 97%      Temp Readings from Last 1 Encounters:   01/23/19 36.5  C (97.7  F) (Oral)    Ht Readings from Last 1 Encounters:   01/23/19 1.854 m (6' 1\")      Wt Readings from Last 1 Encounters:   01/23/19 81.8 kg (180 lb 4.8 oz)    Estimated body mass index is 23.79 kg/m  as calculated from the following:    Height as of this encounter: 1.854 m (6' 1\").    Weight as of this encounter: 81.8 kg (180 lb 4.8 oz).       Anesthesia Plan      History & Physical Review  History and physical reviewed and following examination; no interval change.    ASA Status:  2 .    NPO Status:  > 8 hours    Plan for General and LMA with Intravenous induction. Maintenance will be Balanced.    PONV prophylaxis:  Ondansetron (or other 5HT-3) and Dexamethasone or Solumedrol       Postoperative Care  Postoperative pain management:  Multi-modal analgesia.      Consents  Anesthetic plan, risks, benefits and alternatives discussed with:  Patient and Parent (Mother and/or Father)..                 Darinel Jackson MD  "

## 2019-01-29 ENCOUNTER — TRANSFERRED RECORDS (OUTPATIENT)
Dept: HEALTH INFORMATION MANAGEMENT | Facility: CLINIC | Age: 29
End: 2019-01-29

## 2019-02-06 ENCOUNTER — TELEPHONE (OUTPATIENT)
Dept: SURGERY | Facility: CLINIC | Age: 29
End: 2019-02-06

## 2019-02-06 NOTE — TELEPHONE ENCOUNTER
[facility]  Post op call note     Wally Jalloh was called for an update regarding his recovery.  We spoke for 20 minutes about his recovery and expectations.  He underwent an umbilical hernia repair with mesh by Dr. Rush on January / 23 / 2019.  Today he tells me he is doing well and denies any complaints.  He is eating a normal diet and his bowels are regular.  He states his wounds are healing well and denies any erythema or drainage at his wounds.  Notes his incision is scabbed over and just barely hanging on are his steri strips.  We discussed removing these and applying dressing if needed.    He was instructed to gradually resume all normal activities. He is working with Physical Therapist for his Left groin (sports hernia and adductor release in Aug 2018).  Offered that we do not usually send pts to PT after this surgery, so would encourage he and PT to focus still on the Left groin for rehab.  There may be some more stretching that needs to happen in the muscles between the two sites, but otherwise, would not recommend extra activity for the Umbilical repair alone.  Several other questions about healing, mesh placement, how repaired, which I answered to best of my ability.    The patient states all of his questions were answered and he agrees to follow up in clinic as needed.    Jamal Bullock PA-C        Please route or send letter to:  Primary Care Provider (PCP)

## 2019-06-18 ENCOUNTER — HOSPITAL ENCOUNTER (EMERGENCY)
Facility: CLINIC | Age: 29
Discharge: HOME | End: 2019-06-19
Attending: EMERGENCY MEDICINE | Admitting: EMERGENCY MEDICINE
Payer: COMMERCIAL

## 2019-06-18 VITALS
BODY MASS INDEX: 23.86 KG/M2 | DIASTOLIC BLOOD PRESSURE: 91 MMHG | SYSTOLIC BLOOD PRESSURE: 141 MMHG | RESPIRATION RATE: 14 BRPM | HEIGHT: 73 IN | OXYGEN SATURATION: 99 % | WEIGHT: 180 LBS | HEART RATE: 69 BPM | TEMPERATURE: 98.1 F

## 2019-06-18 DIAGNOSIS — R10.84 ABDOMINAL PAIN, GENERALIZED: ICD-10-CM

## 2019-06-18 LAB
ALBUMIN SERPL-MCNC: 4.8 G/DL (ref 3.4–5)
ALP SERPL-CCNC: 47 U/L (ref 40–150)
ALT SERPL W P-5'-P-CCNC: 25 U/L (ref 0–70)
ANION GAP SERPL CALCULATED.3IONS-SCNC: 4 MMOL/L (ref 3–14)
AST SERPL W P-5'-P-CCNC: 14 U/L (ref 0–45)
BASOPHILS # BLD AUTO: 0 10E9/L (ref 0–0.2)
BASOPHILS NFR BLD AUTO: 0.1 %
BILIRUB SERPL-MCNC: 0.6 MG/DL (ref 0.2–1.3)
BUN SERPL-MCNC: 14 MG/DL (ref 7–30)
CALCIUM SERPL-MCNC: 9.8 MG/DL (ref 8.5–10.1)
CHLORIDE SERPL-SCNC: 104 MMOL/L (ref 94–109)
CO2 SERPL-SCNC: 31 MMOL/L (ref 20–32)
CREAT SERPL-MCNC: 0.94 MG/DL (ref 0.66–1.25)
DIFFERENTIAL METHOD BLD: NORMAL
EOSINOPHIL # BLD AUTO: 0.1 10E9/L (ref 0–0.7)
EOSINOPHIL NFR BLD AUTO: 1.5 %
ERYTHROCYTE [DISTWIDTH] IN BLOOD BY AUTOMATED COUNT: 11.7 % (ref 10–15)
GFR SERPL CREATININE-BSD FRML MDRD: >90 ML/MIN/{1.73_M2}
GLUCOSE SERPL-MCNC: 88 MG/DL (ref 70–99)
HCT VFR BLD AUTO: 44.3 % (ref 40–53)
HGB BLD-MCNC: 15.7 G/DL (ref 13.3–17.7)
IMM GRANULOCYTES # BLD: 0 10E9/L (ref 0–0.4)
IMM GRANULOCYTES NFR BLD: 0.2 %
LIPASE SERPL-CCNC: 41 U/L (ref 73–393)
LYMPHOCYTES # BLD AUTO: 2.9 10E9/L (ref 0.8–5.3)
LYMPHOCYTES NFR BLD AUTO: 35 %
MCH RBC QN AUTO: 30.9 PG (ref 26.5–33)
MCHC RBC AUTO-ENTMCNC: 35.4 G/DL (ref 31.5–36.5)
MCV RBC AUTO: 87 FL (ref 78–100)
MONOCYTES # BLD AUTO: 0.5 10E9/L (ref 0–1.3)
MONOCYTES NFR BLD AUTO: 6.2 %
NEUTROPHILS # BLD AUTO: 4.7 10E9/L (ref 1.6–8.3)
NEUTROPHILS NFR BLD AUTO: 57 %
NRBC # BLD AUTO: 0 10*3/UL
NRBC BLD AUTO-RTO: 0 /100
PLATELET # BLD AUTO: 226 10E9/L (ref 150–450)
POTASSIUM SERPL-SCNC: 3.6 MMOL/L (ref 3.4–5.3)
PROT SERPL-MCNC: 8.4 G/DL (ref 6.8–8.8)
RBC # BLD AUTO: 5.08 10E12/L (ref 4.4–5.9)
SODIUM SERPL-SCNC: 139 MMOL/L (ref 133–144)
WBC # BLD AUTO: 8.2 10E9/L (ref 4–11)

## 2019-06-18 PROCEDURE — 99285 EMERGENCY DEPT VISIT HI MDM: CPT | Mod: 25

## 2019-06-18 PROCEDURE — 83690 ASSAY OF LIPASE: CPT | Performed by: EMERGENCY MEDICINE

## 2019-06-18 PROCEDURE — 80053 COMPREHEN METABOLIC PANEL: CPT | Performed by: EMERGENCY MEDICINE

## 2019-06-18 PROCEDURE — 96374 THER/PROPH/DIAG INJ IV PUSH: CPT

## 2019-06-18 PROCEDURE — 85025 COMPLETE CBC W/AUTO DIFF WBC: CPT | Performed by: EMERGENCY MEDICINE

## 2019-06-18 ASSESSMENT — MIFFLIN-ST. JEOR: SCORE: 1845.35

## 2019-06-19 ENCOUNTER — APPOINTMENT (OUTPATIENT)
Dept: CT IMAGING | Facility: CLINIC | Age: 29
End: 2019-06-19
Attending: EMERGENCY MEDICINE
Payer: COMMERCIAL

## 2019-06-19 LAB
ALBUMIN UR-MCNC: 10 MG/DL
APPEARANCE UR: CLEAR
BILIRUB UR QL STRIP: NEGATIVE
COLOR UR AUTO: YELLOW
GLUCOSE UR STRIP-MCNC: NEGATIVE MG/DL
HGB UR QL STRIP: NEGATIVE
KETONES UR STRIP-MCNC: NEGATIVE MG/DL
LEUKOCYTE ESTERASE UR QL STRIP: NEGATIVE
MUCOUS THREADS #/AREA URNS LPF: PRESENT /LPF
NITRATE UR QL: NEGATIVE
PH UR STRIP: 5.5 PH (ref 5–7)
RBC #/AREA URNS AUTO: 1 /HPF (ref 0–2)
SOURCE: ABNORMAL
SP GR UR STRIP: >1.035 (ref 1–1.03)
UROBILINOGEN UR STRIP-MCNC: NORMAL MG/DL (ref 0–2)
WBC #/AREA URNS AUTO: 3 /HPF (ref 0–5)

## 2019-06-19 PROCEDURE — 81001 URINALYSIS AUTO W/SCOPE: CPT | Performed by: EMERGENCY MEDICINE

## 2019-06-19 PROCEDURE — 25000128 H RX IP 250 OP 636: Performed by: EMERGENCY MEDICINE

## 2019-06-19 PROCEDURE — 25000125 ZZHC RX 250: Performed by: EMERGENCY MEDICINE

## 2019-06-19 PROCEDURE — 74177 CT ABD & PELVIS W/CONTRAST: CPT

## 2019-06-19 PROCEDURE — 96374 THER/PROPH/DIAG INJ IV PUSH: CPT | Mod: 59

## 2019-06-19 RX ORDER — IOPAMIDOL 755 MG/ML
91 INJECTION, SOLUTION INTRAVASCULAR ONCE
Status: COMPLETED | OUTPATIENT
Start: 2019-06-19 | End: 2019-06-19

## 2019-06-19 RX ORDER — KETOROLAC TROMETHAMINE 15 MG/ML
15 INJECTION, SOLUTION INTRAMUSCULAR; INTRAVENOUS ONCE
Status: COMPLETED | OUTPATIENT
Start: 2019-06-19 | End: 2019-06-19

## 2019-06-19 RX ADMIN — SODIUM CHLORIDE 68 ML: 9 INJECTION, SOLUTION INTRAVENOUS at 01:13

## 2019-06-19 RX ADMIN — IOPAMIDOL 91 ML: 755 INJECTION, SOLUTION INTRAVENOUS at 01:13

## 2019-06-19 RX ADMIN — KETOROLAC TROMETHAMINE 15 MG: 15 INJECTION, SOLUTION INTRAMUSCULAR; INTRAVENOUS at 00:45

## 2019-06-19 ASSESSMENT — ENCOUNTER SYMPTOMS
DIARRHEA: 0
VOMITING: 0
DYSURIA: 0
BLOOD IN STOOL: 1
ABDOMINAL PAIN: 1
HEMATURIA: 0

## 2019-06-19 NOTE — ED PROVIDER NOTES
"History     Chief Complaint:  Abdominal Pain    HPI  Wally Jalloh is a 28 year old male who presents to the emergency department today for evaluation of abdominal pain. The patient reports that, around 2100 as he was sitting at a bar, a dull abdominal pain that \"will get sharp and go away\" began that has progressively worsened. He localizes this pain across the lower abdomen, right greater than left. The patient also notes that he has had blood in his stool for the past couple months and has been evaluated for this issue and was told to increase his dietary fiber. He also has a history of left inguinal hernia and umbilical hernia both with subsequent repairs. He denies pain over these previous hernia repair sites. The patient states that his last bowel movement was this morning. He denies testicular pain, dysuria, hematuria, emesis, diarrhea, or concern for STD or STI.    Allergies:  No Known Drug Allergies.    Medications:    Medications reviewed. No pertinent medications.    Past Medical History:    Inguinal hernia   Pancreatitis  Genital warts    Past Surgical History:    Hernia repair  GI surgery  Herniorrhaphy inguinal, left   Tenotomy hip adductor, left  Herniorrhaphy umbilical     Family History:    The patient's family history includes Breast Cancer in his maternal grandmother; Family History Negative in his father and mother.    Social History:  The patient reports that he has been smoking cigars. He has never used smokeless tobacco. He reports that he drinks alcohol. He reports that he does not use drugs.     Review of Systems   Gastrointestinal: Positive for abdominal pain and blood in stool. Negative for diarrhea and vomiting.   Genitourinary: Negative for dysuria, hematuria and testicular pain.   All other systems reviewed and are negative.    Physical Exam     Patient Vitals for the past 24 hrs:   BP Temp Temp src Pulse Resp SpO2 Height Weight   06/18/19 2307 (!) 141/91 98.1  F (36.7  C) Oral 69 14 " "99 % 1.854 m (6' 1\") 81.6 kg (180 lb)     Physical Exam  VS: Reviewed per above  HENT: Mucous membranes moist  EYES: sclera anicteric  CV: Rate as noted, regular rhythm.   RESP: Effort normal. Breath sounds are normal bilaterally.  GI: no rebound guarding but has moderate RLQ tenderness, not distended.  NEURO: Alert, moving all extremities  MSK: No deformity of the extremities  SKIN: Warm and dry    Emergency Department Course     Imaging:  Radiology findings were communicated with the patient who voiced understanding of the findings.    CT Abdomen Pelvis w Contrast  Large volume of retained colonic stool suggestive of  constipation. No other cause for pain demonstrated.  CHHAYA VERDUGO MD  Reading per radiology    Laboratory:  Laboratory findings were communicated with the patient who voiced understanding of the findings.    UA with Microscopic: Color Yellow, Appearance Clear, Specific Gravity >1.035 (H), Protein Albumin 10 (A), Mucous Present (A), All other components normal or within normal limits  CBC: WBC 8.2, HGB 15.7,   CMP: All WNL (Creatinine 0.94)  Lipase: 41 (L)    Interventions:  0045 Toradol 15 mg IV    Emergency Department Course:    2314 IV was inserted and blood was drawn for laboratory testing, results above.    0031 Nursing notes and vitals reviewed.    0038 I performed a physical examination of the patient as documented above.    0111 The patient was sent for a CT Abdomen Pelvis w Contrast while in the emergency department, results above.     0133 The patient provided a urine sample here in the emergency department. This was sent for laboratory testing, findings above.    Impression & Plan      Medical Decision Making:  Wally Jalloh is a 28 year old male who presents to the emergency department today for evaluation of lower abdominal pain. Initial vital signs unrevealing. Exam does not reveal leukocytosis or hepatitis or pancreatitis or abnormal kidney function or evidence of UTI.  Due " to progressive severe nature of symptoms, patient was quite concerned and wanted to pursue CT imaging of the abdomen.  Fortunately this study was unrevealing.  Patient was reassured by these findings.  I did recommend using MiraLAX due to large colonic stool burden as well as f/u with a GI doctor for ongoing intermittent blood in stool.  I also recommended follow-up in the clinic setting.  Close return precautions discussed prior to discharge.    Diagnosis:      ICD-10-CM    1. Abdominal pain, generalized R10.84      Disposition:   The patient is discharged to home.    Discharge Medications:       Review of your medicines      UNREVIEWED medicines. Ask your doctor about these medicines      Dose / Directions   HYDROcodone-acetaminophen 5-325 MG tablet  Commonly known as:  NORCO  Used for:  Umbilical hernia without obstruction and without gangrene, Acute post-operative pain  Ask about: Should I take this medication?      Dose:  1-2 tablet  Take 1-2 tablets by mouth every 4 hours as needed for moderate to severe pain  Quantity:  20 tablet  Refills:  0     MULTI-VITAMINS Tabs      Dose:  1 tablet  Take 1 tablet by mouth daily  Refills:  0          Scribe Disclosure:  Jt DUQUE, am serving as a scribe at 12:31 AM on 6/19/2019 to document services personally performed by Alejandro Lizama MD based on my observations and the provider's statements to me.     EMERGENCY DEPARTMENT         Alejandro Lizama MD  06/19/19 [id number]

## 2020-12-27 ENCOUNTER — HEALTH MAINTENANCE LETTER (OUTPATIENT)
Age: 30
End: 2020-12-27

## 2021-10-04 ENCOUNTER — HEALTH MAINTENANCE LETTER (OUTPATIENT)
Age: 31
End: 2021-10-04

## 2022-01-23 ENCOUNTER — HEALTH MAINTENANCE LETTER (OUTPATIENT)
Age: 32
End: 2022-01-23

## 2022-09-11 ENCOUNTER — HEALTH MAINTENANCE LETTER (OUTPATIENT)
Age: 32
End: 2022-09-11

## 2023-04-03 ENCOUNTER — VIRTUAL VISIT (OUTPATIENT)
Dept: URGENT CARE | Facility: CLINIC | Age: 33
End: 2023-04-03
Payer: COMMERCIAL

## 2023-04-03 DIAGNOSIS — J01.90 ACUTE SINUSITIS WITH SYMPTOMS > 10 DAYS: Primary | ICD-10-CM

## 2023-04-03 PROCEDURE — 99203 OFFICE O/P NEW LOW 30 MIN: CPT | Mod: VID

## 2023-04-03 NOTE — PROGRESS NOTES
CC: Cough, HA, ST, ear pain    Last Tuesday sinus HA, ST and cough and congestion worsened and has been worsening every since.  Started as cold-- now in throat and +cough.  Ears feel plugged.  No fever or chills.  Took COVID test and was negative this past week.    1. Acute sinusitis with symptoms > 10 days    - amoxicillin-clavulanate (AUGMENTIN) 875-125 MG tablet; Take 1 tablet by mouth 2 times daily for 10 days  Dispense: 20 tablet; Refill: 0    Will treat with Augmentin for sinusitis.  Continue with rest and fluids.  Close Follow-up if no change or new or worsening sx prn.    Evangelina Cash MD  [facility] [place]    Phone call duration # 5 minutes.

## 2023-04-30 ENCOUNTER — HEALTH MAINTENANCE LETTER (OUTPATIENT)
Age: 33
End: 2023-04-30

## 2024-05-04 ENCOUNTER — HEALTH MAINTENANCE LETTER (OUTPATIENT)
Age: 34
End: 2024-05-04

## 2025-05-17 ENCOUNTER — HEALTH MAINTENANCE LETTER (OUTPATIENT)
Age: 35
End: 2025-05-17

## (undated) DEVICE — DRSG STERI STRIP 1/2X4" R1547

## (undated) DEVICE — DRAPE LAP W/ARMBOARD 29410

## (undated) DEVICE — SU VICRYL 3-0 SH 27" J316H

## (undated) DEVICE — PACK MINOR SBA15MIFSE

## (undated) DEVICE — GOWN IMPERVIOUS SPECIALTY XLG/XLONG 32474

## (undated) DEVICE — BLADE CLIPPER SGL USE 9680

## (undated) DEVICE — DRSG XEROFORM 1X8"

## (undated) DEVICE — GLOVE PROTEXIS POWDER FREE 8.5 ORTHOPEDIC 2D73ET85

## (undated) DEVICE — LINEN TOWEL PACK X5 5464

## (undated) DEVICE — DECANTER VIAL 2006S

## (undated) DEVICE — GLOVE PROTEXIS MICRO 7.0  2D73PM70

## (undated) DEVICE — NDL 25GA 1.5" 305127

## (undated) DEVICE — NDL 22GA 1.5"

## (undated) DEVICE — SOL WATER IRRIG 1000ML BOTTLE 2F7114

## (undated) DEVICE — SYR 10ML LL W/O NDL

## (undated) DEVICE — SU MONOCRYL 4-0 PS-2 18" UND Y496G

## (undated) DEVICE — SU VICRYL 0 CT-1 27" J340H

## (undated) DEVICE — DRSG TEGADERM 4X4 3/4" 1626

## (undated) DEVICE — DRSG BANDAID 1X3" FABRIC CURITY LATEX FREE KC44101

## (undated) DEVICE — DRSG GAUZE 4X4" 3033

## (undated) DEVICE — DRAIN PENROSE 0.25"X18" LATEX FREE GR201

## (undated) DEVICE — GLOVE PROTEXIS W/NEU-THERA 8.0  2D73TE80

## (undated) DEVICE — PREP CHLORAPREP 26ML TINTED ORANGE  260815

## (undated) DEVICE — SU VICRYL 2-0 TIE 12X18" J905T

## (undated) DEVICE — ESU ELEC BLADE 2.75" COATED/INSULATED E1455

## (undated) DEVICE — DRSG TEGADERM 2 1/2X 2 3/4"

## (undated) DEVICE — SU VICRYL 4-0 PS-2 18" UND J496H

## (undated) DEVICE — GOWN IMPERVIOUS SPECIALTY XL/XLONG 39049

## (undated) DEVICE — SU VICRYL 0 UR-6 27" J603H

## (undated) DEVICE — NDL 19GA 1.5"

## (undated) DEVICE — DRAPE IOBAN INCISE 23X17" 6650EZ

## (undated) DEVICE — ADH LIQUID MASTISOL TOPICAL VIAL 2-3ML 0523-48

## (undated) DEVICE — GLOVE PROTEXIS BLUE W/NEU-THERA 8.5  2D73EB85

## (undated) DEVICE — ESU PENCIL W/SMOKE EVAC NEPTUNE STRYKER 0703-046-000

## (undated) DEVICE — SU VICRYL 2-0 CP-2 27" UND J869H

## (undated) RX ORDER — FENTANYL CITRATE 50 UG/ML
INJECTION, SOLUTION INTRAMUSCULAR; INTRAVENOUS
Status: DISPENSED
Start: 2018-08-28

## (undated) RX ORDER — PROPOFOL 10 MG/ML
INJECTION, EMULSION INTRAVENOUS
Status: DISPENSED
Start: 2019-01-23

## (undated) RX ORDER — GLYCOPYRROLATE 0.2 MG/ML
INJECTION, SOLUTION INTRAMUSCULAR; INTRAVENOUS
Status: DISPENSED
Start: 2019-01-23

## (undated) RX ORDER — DEXAMETHASONE SODIUM PHOSPHATE 4 MG/ML
INJECTION, SOLUTION INTRA-ARTICULAR; INTRALESIONAL; INTRAMUSCULAR; INTRAVENOUS; SOFT TISSUE
Status: DISPENSED
Start: 2019-01-23

## (undated) RX ORDER — PROPOFOL 10 MG/ML
INJECTION, EMULSION INTRAVENOUS
Status: DISPENSED
Start: 2018-08-28

## (undated) RX ORDER — CEFAZOLIN SODIUM 2 G/100ML
INJECTION, SOLUTION INTRAVENOUS
Status: DISPENSED
Start: 2019-01-23

## (undated) RX ORDER — BUPIVACAINE HYDROCHLORIDE AND EPINEPHRINE 5; 5 MG/ML; UG/ML
INJECTION, SOLUTION EPIDURAL; INTRACAUDAL; PERINEURAL
Status: DISPENSED
Start: 2018-08-28

## (undated) RX ORDER — HYDROCODONE BITARTRATE AND ACETAMINOPHEN 5; 325 MG/1; MG/1
TABLET ORAL
Status: DISPENSED
Start: 2019-01-23

## (undated) RX ORDER — HYDROMORPHONE HYDROCHLORIDE 1 MG/ML
INJECTION, SOLUTION INTRAMUSCULAR; INTRAVENOUS; SUBCUTANEOUS
Status: DISPENSED
Start: 2018-08-28

## (undated) RX ORDER — FENTANYL CITRATE 50 UG/ML
INJECTION, SOLUTION INTRAMUSCULAR; INTRAVENOUS
Status: DISPENSED
Start: 2019-01-23

## (undated) RX ORDER — LIDOCAINE HYDROCHLORIDE AND EPINEPHRINE 10; 10 MG/ML; UG/ML
INJECTION, SOLUTION INFILTRATION; PERINEURAL
Status: DISPENSED
Start: 2018-08-28

## (undated) RX ORDER — CEFAZOLIN SODIUM 2 G/100ML
INJECTION, SOLUTION INTRAVENOUS
Status: DISPENSED
Start: 2018-08-28

## (undated) RX ORDER — NEOSTIGMINE METHYLSULFATE 1 MG/ML
VIAL (ML) INJECTION
Status: DISPENSED
Start: 2019-01-23

## (undated) RX ORDER — ONDANSETRON 2 MG/ML
INJECTION INTRAMUSCULAR; INTRAVENOUS
Status: DISPENSED
Start: 2018-08-28

## (undated) RX ORDER — ACETAMINOPHEN 325 MG/1
TABLET ORAL
Status: DISPENSED
Start: 2018-08-28

## (undated) RX ORDER — ONDANSETRON 2 MG/ML
INJECTION INTRAMUSCULAR; INTRAVENOUS
Status: DISPENSED
Start: 2019-01-23

## (undated) RX ORDER — LIDOCAINE HYDROCHLORIDE 20 MG/ML
INJECTION, SOLUTION EPIDURAL; INFILTRATION; INTRACAUDAL; PERINEURAL
Status: DISPENSED
Start: 2019-01-23

## (undated) RX ORDER — OXYCODONE AND ACETAMINOPHEN 5; 325 MG/1; MG/1
TABLET ORAL
Status: DISPENSED
Start: 2018-08-28